# Patient Record
(demographics unavailable — no encounter records)

---

## 2018-09-26 NOTE — CAT SCAN REPORT
FINAL REPORT



EXAM:  CT ABDOMEN PELVIS W CON



HISTORY:  COLON CANCER 



TECHNIQUE:  CT examination of the ABDOMEN after IV contrast



CT examination of the PELVIS after IV contrast



PRIORS:  None.



FINDINGS:  

Degenerative change regional skeleton. No acute fracture. No CT

evidence of significant osseous lesion. Left L5 spondylolysis

without evidence of spondylolisthesis. 



No evidence of liver lesion. Nonspecific surgical clips adjacent

to the posterior right liver margin. Gallbladder not visualized.

Slight prominence of the common bile duct may reflect reservoir

effect after cholecystectomy. Diameter 10 mm. 



Normal-appearing adrenals, pancreas, and spleen. Intact normal

caliber abdominal aorta with moderate to severe calcified and

noncalcified atherosclerotic plaque. Plaque extends into the

aortic branches. Normal caliber IVC. 



Nonspecific, smoothly marginated, simple appearing, low density

bilateral renal lesions are statistically most likely cysts.

Bilateral renal hilar calcification most likely reflects

atherosclerosis. 



Staghorn like calculus left renal lower pole measures

approximately 10 x 16 mm. Nonspecific slight left hydronephrosis

and left ureteral distention diffusely. No CT evidence of left

ureteral calculus. No left renal excretion delay. 



Nonspecific trace free fluid adjacent to the posterior right

liver very small fat containing umbilical hernia. No inguinal

hernia. 



No retroperitoneal adenopathy. No mesenteric mass.

Normal-appearing stomach and duodenum. No small bowel distention

in the abdomen and pelvis. 



No pelvic free fluid. Normal-appearing rectum. No definite

prostate or seminal vesicle abnormality. 



Nonspecific diffuse mural thickening in the urinary bladder. 



Moderate sigmoid diverticulosis. Slight diffuse sigmoid colon

mural thickening is nonspecific. No adjacent inflammatory change.





No gross ascites, free air, or colonic distention.

Normal-appearing cecum, terminal ileum, and appendix. 



There are 2 short segments of luminal narrowing in the region the

transverse colon. Proximal involvement appears to be associated

with slight mural thickening and is 2.5 cm long. Distal

involvement without mural thickening is 5 mm long. These may be

artifact of peristalsis. Differential includes postoperative

scar, inflammation, infection, and/or apple-core neoplasm. 



IMPRESSION:  

Two short segments of luminal narrowing in the transverse colon.

These may be artifact of peristalsis. Proximal "lesion" appears

to contain mural thickening and is 2.5 cm long. Differential

includes postoperative scar, inflammation, infection, and/or

apple-core neoplastic



Moderate sigmoid diverticulosis. Slight diffuse mural thickening

in this region may be scarring from prior diverticulitis.

Differential includes slight acute diverticulitis without

adjacent inflammatory change



Left L5 spondylolysis without evidence of spondylolisthesis



10 x 16 mm staghorn like calculus in left renal lower pole

nonspecific slight diffuse left hydronephrosis without CT

evidence of ureteral calculus



Slight diffuse mural thickening in urinary bladder may be from

outlet obstruction. Differential includes cystitis

## 2018-09-26 NOTE — CAT SCAN REPORT
FINAL REPORT



EXAM:  CT CHEST W CON



HISTORY:  COLON CANCER 



TECHNIQUE:  CT examination of the chest after IV contrast



PRIORS:  AP CT 9/26/2018



FINDINGS:  

Please see same day AP CT report for abdominal findings. Normal

cardiac size without pericardial effusion. Intact normal caliber

thoracic aorta. Normal-appearing esophagus. No hilar mass or

mediastinal adenopathy. The visible pulmonary arteries are

diffusely patent. No acute fracture or significant osseous

lesion.



No pneumothorax or pleural effusion. No focal pulmonary

consolidation. Scattered slight pulmonary emphysema. No evidence

of lung nodule or mass.



IMPRESSION:  

No evidence of acute cardiopulmonary disease or mass/adenopathy



Slight pulmonary emphysema

## 2018-12-27 NOTE — DISCHARGE SUMMARY
Short Stay Discharge Plan


Activity: other (no straining )


Weight Bearing Status: Full Weight Bearing


Diet: low fat, low cholesterol, low salt


Special Instructions: other (inc fluids )


Follow up with: 


RAYNA MELTON MD [Primary Care Provider] - 7 Days


MAVERICK MEJIA MD [Staff Physician] - 7 Days

## 2018-12-27 NOTE — POST OPERATIVE NOTE
Date of procedure: 12/27/18


Pre-op diagnosis: L renal stone 


Post-op diagnosis: same


Findings: 





as above 


Procedure: 





L ESWL


Anesthesia: ELIZABETH


Surgeon: MAVERICK MJEIA


Estimated blood loss: none


Pathology: none


Condition: stable


Disposition: PACU

## 2018-12-27 NOTE — ANESTHESIA CONSULTATION
Anesthesia Consult and Med Hx


Date of service: 12/27/18





- Airway


Anesthetic Teeth Evaluation: Caps (multiple missing teeth; non loose)


ROM Head & Neck: Adequate


Mental/Hyoid Distance: Adequate


Mallampati Class: Class II


Intubation Access Assessment: Probably Good





- Pulmonary Exam


CTA: No (diffuse wheezing not cleared with cough; good air entry b/l)





- Cardiac Exam


Cardiac Exam: RRR





- Pre-Operative Health Status


ASA Pre-Surgery Classification: ASA3


Proposed Anesthetic Plan: General





- Pulmonary


Hx Smoking: Yes (>100pk yr hx)


SOB: Yes (chronic LU; breathing at baseline today)


COPD: Yes (daily anoro and prn albuterol)


Home Oxygen Therapy: No


Hx Sleep Apnea: No (YENNIFER PRE SCREEN HIGH RISK)





- Cardiovascular System


Hx Hypertension: Yes (took antihypertensives this morning)


Hx Coronary Artery Disease: Yes


Hx Heart Attack/AMI: No


Hx Percutaneous Transluminal Coronary Angioplasty (PTCA): No


Hx Cardia Arrhythmia: No





- Central Nervous System


Hx Neuromuscular Disorder: No (peripheral neuropathy)


Hx Seizures: No


CVA: No


Hx Psychiatric Problems: No





- Gastrointestinal


Hx Gastroesophageal Reflux Disease: Yes (asymptomatic today)





- Endocrine


Hx Renal Disease: No


Hx Liver Disease: Yes (remote hx colon cancer with liver met s/p lobectomy)


Hx Non-Insulin Dependent Diabetes: Yes


Hx Thyroid Disease: No





- Other Systems


Hx Alcohol Use: Yes (RARELY)


Hx Substance Use: No


Hx Cancer: Yes (hx metastatic colon ca s/p chemo. Port in left arm; not 

currently used)





- Additional Comments


Anesthesia Medical History Comments: No hx anesthetic complications. Used anoro 

this morning but no albuterol as he felt breathing was at baseline. Will 

administer albuterol neb preop.

## 2018-12-27 NOTE — ANESTHESIA DAY OF SURGERY
Anesthesia Day of Surgery





- Day of Surgery


Patient Examined: Yes


Patient H&P Reviewed: Yes


Patient is NPO: Yes


Beta Blockers: Yes


Pulmonary Clearance: Yes

## 2018-12-27 NOTE — OPERATIVE REPORT
PREOPERATIVE DIAGNOSIS:  Large left renal stone.



POSTOPERATIVE DIAGNOSIS:  Large left renal stone.



PROCEDURE:  In situ left lithotripsy.



SURGEON:  Osmany Reyes MD



ANESTHESIA:  General.



FINDINGS:  This gentleman has a 1.5 cm lower pole left renal stone.  He now

presents for lithotripsy.  There is another fragment in the small fallon.



DESCRIPTION OF PROCEDURE:  The patient was brought to the operating room and

placed on the operating table.  Following induction of anesthesia, stone was

well localized, both in the AP and oblique images.  Shocks were begun at 1 kV. 

Renal pause was carried out.  A total of 2500 shocks were given, maximum of 8

kV.  The patient tolerated the procedure well.  There was definitely less

density in the stone, but the stone was still quite prominent.  There was no

stone that was seen going down the ureter, so we decided not to place the stent.

 I suspect he may need percutaneous nephrolithotomy to get rid of the stone.  He

is 73 and he will have to decide if he wants more invasive procedures.  We

decided not to do cystoscopy.  I had explained this to his wife and brought to

recovery in stable condition.





DD: 12/27/2018 09:49

DT: 12/27/2018 12:08

JOB# [id number]  [id number]

DAVID/ANISA

## 2019-12-02 NOTE — EMERGENCY DEPARTMENT REPORT
HPI





- General


Time Seen by Provider: 12/02/19 06:50





- HPI


HPI: 


74-year-old C male presents to the emergency department via EMS from home as 

unresponsive.  EMS says that he has a elderly wife who was only able to provide 

information that he has a history of COPD and myelodysplastic syndrome.  He also

has a previous history of colon cancer with liver metastasis, but it is unknown 

whether or not this is in remission.  The patient did present altered and 

unresponsive.  However within 5 minutes of arrival the patient began talking and

became more responsive, although still does display some confusion.  Patient 

admits to having oxygen at home for his COPD but has not been using it.  He is a

tobacco smoker but denies any illicit drug use.  Patient initially had a room 

air pulse ox in the 50s that went up to close to 100% with some bag valve 

ventilation.





PCP: Dr Renetta Crespo: Dr Shi


Heme/Onc: Dr Devin CALVILLO Past Medical Hx





- Past Medical History


Hx Hypertension: Yes (took antihypertensives this morning)


Hx Heart Attack/AMI: No


Hx Diabetes: Yes (NO MEDS)


Hx GERD: Yes


Hx Liver Disease: Yes (remote hx colon cancer with liver met s/p lobectomy)


Hx Renal Disease: No


Hx Seizures: No


Hx Kidney Stones: Yes


Hx COPD: Yes (daily anoro and prn albuterol)


Hx HIV: No





- Social History


Smoking Status: Current Every Day Smoker





- Medications


Home Medications: 


                                Home Medications











 Medication  Instructions  Recorded  Confirmed  Last Taken  Type


 


Aspirin EC [Aspirin Enteric Coated 81 mg PO QDAY 05/05/16 12/02/19 10 Days Ago 

History





TAB]    ~12/17/18 


 


Metoprolol [Lopressor] 100 mg PO DAILY 05/05/16 12/18/18 12/27/18 04:30 History


 


Pravastatin Sodium [Pravastatin] 20 mg PO QHS 05/05/16 12/18/18 12/27/18 04:30 

History


 


amLODIPine [Norvasc] 5 mg PO DAILY 05/05/16 12/02/19 12/27/18 04:30 History


 


ALBUTEROL Inhaler(NF) [VENTOLIN 2 puff IH QDAY PRN 12/18/18 12/02/19 Unknown 

History





Inhaler(NF)]     


 


Fluticasone [Flonase] 1 spray NS QDAY 12/18/18 12/27/18 12/26/18 History


 


Glucosam-Chondro-Herb 149-Hyal 1,500 mg PO BID 12/18/18 12/02/19 10 Days Ago 

History





[Glucosamine-Chondr Complex Tab]    ~12/17/18 


 


Multivit-Min/FA/Lycopen/Lutein 1 each PO DAILY 12/18/18 12/18/18 10 Days Ago 

History





[Centrum Silver Tablet]    ~12/17/18 


 


Omega-3 Fatty Acids/Fish Oil [Fish 1 each PO DAILY 12/18/18 12/18/18 10 Days Ago

 History





Oil 1,000 mg Capsule]    ~12/17/18 


 


Umeclidinium Brm/Vilanterol Tr 1 each IH DAILY 12/18/18 12/18/18 12/27/18 04:30 

History





[Anoro Ellipta 62.5-25 Mcg INH]     


 


Metoprolol Succinate [Toprol Xl] 100 mg PO 12/02/19  Unknown History


 


Multivit-Min/FA/Lycopen/Lutein  12/02/19  Unknown History





[Centrum Silver Tablet]     














ED Review of Systems


ROS: 


Stated complaint: SEAN


Other details as noted in HPI





Comment: All other systems reviewed and negative


Constitutional: denies: chills, fever


Eyes: denies: eye pain, vision change


ENT: denies: ear pain, throat pain


Respiratory: shortness of breath, SOB with exertion, SOB at rest, wheezing


Cardiovascular: denies: chest pain, palpitations


Gastrointestinal: denies: abdominal pain, vomiting


Neurological: confusion.  denies: headache





Physical Exam





- Physical Exam


Physical Exam: 





GENERAL: The patient is ill-appearing.


HENT: Normocephalic.  Atraumatic.    Patient has moist mucous membranes.


EYES: Extraocular motions are intact.  Pupils equal reactive to light 

bilaterally.


NECK: Supple. Trachea is midline.


CHEST/LUNGS: Coarse breath sounds were the chest.  There is tachypnea with some 

accessory muscle use.  There is respiratory distress noted.


HEART/CARDIOVASCULAR: Regular.  There is mild tachycardia.  There is no murmur.


ABDOMEN: Abdomen is soft, nontender.  Patient has normal bowel sounds.  There is

no abdominal distention.


SKIN: Skin is warm and dry.


NEURO: Patient is awake and confused.  Nonverbal.  Withdraws from painful 

stimuli.  Not following commands.


MUSCULOSKELETAL: There is no tenderness or deformity.  There is no evidence of 

acute injury.





ED Course





- Consultations


Consultation #1: 





12/02/19 08:42


I spoke to the cardiologist on-call, Dr. Sosa, who is aware of the patient's 

ischemic changes on EKG and the elevated troponin, along with his renal 

insufficiency, anemia and thrombocytopenia.  The cardiology service will see the

patient as a consult.





12/02/19 14:32


It turns out the patient follows with Dr Izaguirre of [facility]. 

[facility] made aware and consult switched. 





ED Medical Decision Making





- Lab Data


Result diagrams: 


                                 12/02/19 06:58





                                 12/02/19 06:58





- EKG Data


-: EKG Interpreted by Me


EKG shows normal: sinus rhythm, axis, intervals, QRS complexes, ST-T waves (st 

depressions throughout most leads. No STEMI)


Rate: tachycardia (116 bpm)





- EKG Data


When compared to previous EKG there are: previous EKG unavailable


Interpretation: other (sinus tachycardia at 116 bpm, PVCs, ST depressions 

throughout most leads but no ST elevation)





- Radiology Data


Radiology results: report reviewed, image reviewed


interpreted by me: 





Chest x-ray shows some pulmonary vascular congestion, bilateral basilar pleural 

effusions and interstitial edema.





CT HEAD WITHOUT CONTRAST INDICATION / CLINICAL INFORMATION: Altered mental 

status.. Colon cancer. TECHNIQUE: Axial imaging performed from the skull apex 

through the skull base without the use of contrast. Sagittal and coronal ref

ormatted images. All CT scans at this location are performed using CT dose 

reduction for ALARA by means of automated exposure control. COMPARISON: None 

available. FINDINGS: CEREBRAL PARENCHYMA: Mild cortical atrophy and nonspecific 

chronic white matter changes are identified which are likely appropriate for 

this patient's age. The gray-white interface is well-defined. No abnormal 

density or acute territorial infarct is identified. No evidence for brain 

metastasis given no IV contrast was administered. HEMORRHAGE: None. EXTRA-AXIAL 

SPACES: Normal in size and morphology for the patient's age. VENTRICULAR SYSTEM:

Normal in size and morphology for the patient's age. MIDLINE SHIFT OR 

HERNIATION: None. CEREBELLUM / BRAINSTEM: No significant abnormality. CALVARIUM:

No significant abnormality. ORBITS: Normal as visualized. PARANASAL SINUSES / 

MASTOID AIR CELLS: Normal as visualized. SOFT TISSUES of HEAD: No significant 

abnormality. ADDITIONAL FINDINGS: None. IMPRESSION: No acute intracranial a

bnormality. Age-appropriate cortical volume loss and chronic white matter 

changes. 





- Medical Decision Making


This patient originally was sent in after having an unresponsive episode and did

present to the emergency department nonverbal and not following commands.  

However about 5 minutes into his ED course the patient became more awake, alert 

and oriented.  He does appear to have some respiratory distress with coarse 

breath sounds, accessory muscle use, tachypnea.  He was placed on a BiPAP which 

did improve his work of breathing.  Chest x-ray shows some pulmonary vascular 

congestion, interstitial edema and some basilar pleural effusions.  Patient's 

labs show a hemoglobin of 6.3 and thrombocytopenia consistent with his 

myelodysplastic syndrome.  2 units of packed red blood cells have been ordered 

for transfusion, however the patient has received some Lasix for some diuresis. 

Patient also appears to have some acute kidney injury, however the patient's 

wife says that he does have a history of some chronic kidney disease.  EKG did 

not show any signs of ST elevation MI but he does have some global ischemia with

ST depressions throughout most leads.  This may be secondary to his respiratory 

status and previous hypoxia and his symptomatic anemia.  The patient will be 

admitted to the hospital for further evaluation and treatment and was accepted 

for admission by the hospitalist service.








- Differential Diagnosis


MDS, Pneumonia, CHF, MI, Dysrythmia


Critical Care Time: Yes


Critical care time in (mins) excluding proc time.: 35


Critical care attestation.: 


If time is entered above; I have spent that time in minutes in the direct care 

of this critically ill patient, excluding procedure time.  Critical care time 

was spent on this patient and doing his initial evaluation, multiple re-

evaluations, ordering and interpretation of labs and imaging, discussion with 

the cardiology service, multiple discussions with the patient and his family.





Critical Care Time: 





35 minutes





ED Disposition


Clinical Impression: 


 Symptomatic anemia, Thrombocytopenia, Anemia requiring transfusions, Elevated 

troponin, Abnormal ECG, Renal insufficiency





Acute respiratory failure


Qualifiers:


 Respiratory failure complication: hypoxia Qualified Code(s): J96.01 - Acute 

respiratory failure with hypoxia





Dyspnea


Qualifiers:


 Dyspnea type: shortness of breath Qualified Code(s): R06.02 - Shortness of 

breath





CHF (congestive heart failure)


Qualifiers:


 Heart failure type: unspecified Heart failure chronicity: unspecified Qualified

Code(s): I50.9 - Heart failure, unspecified





Disposition: DC-09 OP ADMIT IP TO THIS HOSP


Is pt being admited?: Yes


Condition: Serious


Time of Disposition: 08:38

## 2019-12-02 NOTE — HISTORY AND PHYSICAL REPORT
History of Present Illness


Date of examination: 12/02/19


Date of admission: 


12/02/19 08:38





Chief complaint: 





sob


History of present illness: 





74-year-old male presents to the emergency department via EMS from home as 

unresponsive.  EMS says that he has a elderly wife who was only able to provide 

information that he has a history of COPD and myelodysplastic syndrome.  He also

has a previous history of colon cancer with liver metastasis, but it is unknown 

whether or not this is in remission.  The patient did present altered and 

unresponsive per ER physician.  However within 5 minutes of arrival the patient 

began talking and became more responsive, although still does displayed some 

confusion.  Upon mu eval he was awake and alert with conversational dyspnea but 

appropriate.  Family at bedside.  Patient admits to having oxygen at home for 

his COPD but has not been using it.  He is a tobacco smoker but denies any 

illicit drug use.  Patient initially had a room air pulse ox in the 50s that 

went up to close to 100% with some bag valve ventilation.  He denies CP








Past History


Past Medical History: COPD, diabetes, GERD, hypertension, other (liver dz)


Past Surgical History: No surgical history


Social history: smoking


Family history: no significant family history





Medications and Allergies


                                    Allergies











Allergy/AdvReac Type Severity Reaction Status Date / Time


 


morphine AdvReac  HALLUCINATI Verified 12/18/18 16:36





   ONS  


 


IVP DYE AdvReac  Hives Uncoded 12/18/18 16:36











                                Home Medications











 Medication  Instructions  Recorded  Confirmed  Last Taken  Type


 


Aspirin EC [Aspirin Enteric Coated 81 mg PO QDAY 05/05/16 12/02/19 10 Days Ago 

History





TAB]    ~12/17/18 


 


Metoprolol [Lopressor] 100 mg PO DAILY 05/05/16 12/18/18 12/27/18 04:30 History


 


Pravastatin Sodium [Pravastatin] 20 mg PO QHS 05/05/16 12/18/18 12/27/18 04:30 

History


 


amLODIPine [Norvasc] 5 mg PO DAILY 05/05/16 12/02/19 12/27/18 04:30 History


 


ALBUTEROL Inhaler(NF) [VENTOLIN 2 puff IH QDAY PRN 12/18/18 12/02/19 Unknown 

History





Inhaler(NF)]     


 


Fluticasone [Flonase] 1 spray NS QDAY 12/18/18 12/27/18 12/26/18 History


 


Glucosam-Chondro-Herb 149-Hyal 1,500 mg PO BID 12/18/18 12/02/19 10 Days Ago 

History





[Glucosamine-Chondr Complex Tab]    ~12/17/18 


 


Multivit-Min/FA/Lycopen/Lutein 1 each PO DAILY 12/18/18 12/18/18 10 Days Ago 

History





[Centrum Silver Tablet]    ~12/17/18 


 


Omega-3 Fatty Acids/Fish Oil [Fish 1 each PO DAILY 12/18/18 12/18/18 10 Days Ago

 History





Oil 1,000 mg Capsule]    ~12/17/18 


 


Umeclidinium Brm/Vilanterol Tr 1 each IH DAILY 12/18/18 12/18/18 12/27/18 04:30 

History





[Anoro Ellipta 62.5-25 Mcg INH]     


 


Metoprolol Succinate [Toprol Xl] 100 mg PO 12/02/19  Unknown History


 


Multivit-Min/FA/Lycopen/Lutein  12/02/19  Unknown History





[Centrum Silver Tablet]     














Review of Systems


All systems: negative





Exam





- Constitutional


Vitals: 


                                        











Temp Pulse Resp BP Pulse Ox


 


 98.1 F   107 H  18   128/62   99 


 


 12/02/19 13:27  12/02/19 14:47  12/02/19 14:47  12/02/19 14:47  12/02/19 14:47











General appearance: Present: mild distress, well-nourished





- EENT


Eyes: Present: PERRL


ENT: hearing intact, clear oral mucosa





- Neck


Neck: Present: supple, normal ROM





- Respiratory


Respiratory effort: normal


Respiratory: bilateral: CTA





- Cardiovascular


Heart Sounds: Present: S1 & S2.  Absent: rub, click





- Extremities


Extremities: pulses symmetrical, No edema


Peripheral Pulses: within normal limits





- Abdominal


General gastrointestinal: Present: soft, non-tender, non-distended, normal bowel

sounds


Male genitourinary: Present: normal





- Integumentary


Integumentary: Present: clear, warm, dry





- Musculoskeletal


Musculoskeletal: gait normal, strength equal bilaterally





- Psychiatric


Psychiatric: appropriate mood/affect, intact judgment & insight





- Neurologic


Neurologic: CNII-XII intact, moves all extremities





Results





- Labs


CBC & Chem 7: 


                                 12/02/19 06:58





                                 12/02/19 06:58


Labs: 


                             Laboratory Last Values











WBC  5.0 K/mm3 (4.5-11.0)   12/02/19  06:58    


 


RBC  1.99 M/mm3 (3.65-5.03)  L  12/02/19  06:58    


 


Hgb  6.3 gm/dl (11.8-15.2)  L  12/02/19  06:58    


 


Hct  21.1 % (35.5-45.6)  L  12/02/19  06:58    


 


MCV  106 fl (84-94)  H  12/02/19  06:58    


 


MCH  31 pg (28-32)   12/02/19  06:58    


 


MCHC  30 % (32-34)  L  12/02/19  06:58    


 


RDW  30.0 % (13.2-15.2)  H  12/02/19  06:58    


 


Plt Count  95 K/mm3 (140-440)  L  12/02/19  06:58    


 


Add Manual Diff  Complete   12/02/19  06:58    


 


Total Counted  100   12/02/19  06:58    


 


Seg Neuts % (Manual)  66.0 % (40.0-70.0)   12/02/19  06:58    


 


Band Neutrophils %  4.0 %  12/02/19  06:58    


 


Lymphocytes % (Manual)  25.0 % (13.4-35.0)   12/02/19  06:58    


 


Reactive Lymphs % (Man)  0 %  12/02/19  06:58    


 


Monocytes % (Manual)  3.0 % (0.0-7.3)   12/02/19  06:58    


 


Eosinophils % (Manual)  0 % (0.0-4.3)   12/02/19  06:58    


 


Basophils % (Manual)  2.0 % (0.0-1.8)  H  12/02/19  06:58    


 


Metamyelocytes %  0 %  12/02/19  06:58    


 


Myelocytes %  0 %  12/02/19  06:58    


 


Promyelocytes %  0 %  12/02/19  06:58    


 


Blast Cells %  0 %  12/02/19  06:58    


 


Nucleated RBC %  7.0 % (0.0-0.9)  H  12/02/19  06:58    


 


Seg Neutrophils # Man  0.0 K/mm3 (1.8-7.7)  L  12/02/19  06:58    


 


Band Neutrophils #  0.0 K/mm3  12/02/19  06:58    


 


Lymphocytes # (Manual)  0.0 K/mm3 (1.2-5.4)  L  12/02/19  06:58    


 


Abs React Lymphs (Man)  0.0 K/mm3  12/02/19  06:58    


 


Monocytes # (Manual)  0.0 K/mm3 (0.0-0.8)   12/02/19  06:58    


 


Eosinophils # (Manual)  0.0 K/mm3 (0.0-0.4)   12/02/19  06:58    


 


Basophils # (Manual)  0.0 K/mm3 (0.0-0.1)   12/02/19  06:58    


 


Metamyelocytes #  0.0 K/mm3  12/02/19  06:58    


 


Myelocytes #  0.0 K/mm3  12/02/19  06:58    


 


Promyelocytes #  0.0 K/mm3  12/02/19  06:58    


 


Blast Cells #  0.0 K/mm3  12/02/19  06:58    


 


WBC Morphology  Not Reportable   12/02/19  06:58    


 


Hypersegmented Neuts  Not Reportable   12/02/19  06:58    


 


Hyposegmented Neuts  Not Reportable   12/02/19  06:58    


 


Hypogranular Neuts  Not Reportable   12/02/19  06:58    


 


Smudge Cells  Not Reportable   12/02/19  06:58    


 


Toxic Granulation  Not Reportable   12/02/19  06:58    


 


Toxic Vacuolation  Not Reportable   12/02/19  06:58    


 


Dohle Bodies  Not Reportable   12/02/19  06:58    


 


Pelger-Huet Anomaly  Not Reportable   12/02/19  06:58    


 


Gasper Rods  Not Reportable   12/02/19  06:58    


 


Platelet Estimate  Consistent w auto   12/02/19  06:58    


 


Clumped Platelets  Not Reportable   12/02/19  06:58    


 


Plt Clumps, EDTA  Not Reportable   12/02/19  06:58    


 


Large Platelets  Few   12/02/19  06:58    


 


Giant Platelets  Not Reportable   12/02/19  06:58    


 


Platelet Satelliting  Not Reportable   12/02/19  06:58    


 


Plt Morphology Comment  Not Reportable   12/02/19  06:58    


 


RBC Morphology  Not Reportable   12/02/19  06:58    


 


Dimorphic RBCs  Not Reportable   12/02/19  06:58    


 


Polychromasia  Not Reportable   12/02/19  06:58    


 


Hypochromasia  Not Reportable   12/02/19  06:58    


 


Poikilocytosis  2+   12/02/19  06:58    


 


Anisocytosis  3+   12/02/19  06:58    


 


Microcytosis  Not Reportable   12/02/19  06:58    


 


Macrocytosis  Not Reportable   12/02/19  06:58    


 


Spherocytes  Not Reportable   12/02/19  06:58    


 


Pappenheimer Bodies  Not Reportable   12/02/19  06:58    


 


Sickle Cells  Not Reportable   12/02/19  06:58    


 


Target Cells  Not Reportable   12/02/19  06:58    


 


Tear Drop Cells  1+   12/02/19  06:58    


 


Ovalocytes  1+   12/02/19  06:58    


 


Helmet Cells  Not Reportable   12/02/19  06:58    


 


Chaparro-Salmon Bodies  Not Reportable   12/02/19  06:58    


 


Cabot Rings  Not Reportable   12/02/19  06:58    


 


Estee Cells  Not Reportable   12/02/19  06:58    


 


Bite Cells  Not Reportable   12/02/19  06:58    


 


Crenated Cell  Not Reportable   12/02/19  06:58    


 


Elliptocytes  1+   12/02/19  06:58    


 


Acanthocytes (Spur)  Not Reportable   12/02/19  06:58    


 


Rouleaux  Not Reportable   12/02/19  06:58    


 


Hemoglobin C Crystals  Not Reportable   12/02/19  06:58    


 


Schistocytes  Rare   12/02/19  06:58    


 


Malaria parasites  Not Reportable   12/02/19  06:58    


 


Sherif Bodies  Not Reportable   12/02/19  06:58    


 


Hem Pathologist Commnt  No   12/02/19  06:58    


 


D-Dimer  891.58 ng/mlDDU (0-234)  H  12/02/19  06:58    


 


POC ABG pH  7.072  (7.35-7.45)  L  12/02/19  06:58    


 


POC ABG pCO2  47.1  (35-45)  H  12/02/19  06:58    


 


POC ABG pO2  327  ()  H  12/02/19  06:58    


 


POC ABG HCO3  13.7  (22-26 mml/L)   12/02/19  06:58    


 


POC ABG Total CO2  15  (23-27mmol/L)   12/02/19  06:58    


 


POC ABG O2 Sat  100   12/02/19  06:58    


 


POC ABG Base Excess  -16  ((-2) - (+3)mmol/L)   12/02/19  06:58    


 


FiO2  100 %  12/02/19  06:58    


 


Sodium  141 mmol/L (137-145)   12/02/19  06:58    


 


Potassium  4.6 mmol/L (3.6-5.0)   12/02/19  06:58    


 


Chloride  99.6 mmol/L ()   12/02/19  06:58    


 


Carbon Dioxide  15 mmol/L (22-30)  L  12/02/19  06:58    


 


Anion Gap  31 mmol/L  12/02/19  06:58    


 


BUN  30 mg/dL (9-20)  H  12/02/19  06:58    


 


Creatinine  1.8 mg/dL (0.8-1.5)  H  12/02/19  06:58    


 


Estimated GFR  37 ml/min  12/02/19  06:58    


 


BUN/Creatinine Ratio  17 %  12/02/19  06:58    


 


Glucose  259 mg/dL ()  H  12/02/19  06:58    


 


Calcium  8.7 mg/dL (8.4-10.2)   12/02/19  06:58    


 


Total Bilirubin  0.90 mg/dL (0.1-1.2)   12/02/19  06:58    


 


AST  55 units/L (5-40)  H  12/02/19  06:58    


 


ALT  36 units/L (7-56)   12/02/19  06:58    


 


Alkaline Phosphatase  62 units/L ()   12/02/19  06:58    


 


Ammonia  30.0 umol/L (25-60)   12/02/19  06:58    


 


Troponin T  0.118 ng/mL (0.00-0.029)  H*  12/02/19  06:58    


 


NT-Pro-B Natriuret Pep  4210 pg/mL (0-900)  H  12/02/19  07:25    


 


Total Protein  7.0 g/dL (6.3-8.2)   12/02/19  06:58    


 


Albumin  4.2 g/dL (3.9-5)   12/02/19  06:58    


 


Albumin/Globulin Ratio  1.5 %  12/02/19  06:58    


 


Triglycerides  46 mg/dL (2-149)   12/02/19  06:58    


 


Cholesterol  114 mg/dL ()   12/02/19  06:58    


 


LDL Cholesterol Direct  68 mg/dL ()   12/02/19  06:58    


 


HDL Cholesterol  51 mg/dL (40-59)   12/02/19  06:58    


 


Cholesterol/HDL Ratio  2.23 %  12/02/19  06:58    


 


TSH  1.590 mlU/mL (0.270-4.200)   12/02/19  06:58    


 


Blood Type  O POSITIVE   12/02/19  08:40    


 


Antibody Screen  Negative   12/02/19  08:40    


 


Crossmatch  See Detail   12/02/19  08:40    














Assessment and Plan


Assessment and plan: 





Acute hypoxic resp failure.  Etiology likely sec heart failure.  CXR with 

bilateral interstitial opacities and elevated BNP.  Check Echo





Elevated trop.  Cards consulted.  Check ECHO





MDS.  Pt with recent dx of MDS and is followed by Devin Wolfe.  Heme consult





ARf.  Nephrology consult





Anemia.  Tansfusee 2 units PRBCs

## 2019-12-02 NOTE — CAT SCAN REPORT
CT HEAD WITHOUT CONTRAST



INDICATION / CLINICAL INFORMATION:  Altered mental status.. Colon cancer.



TECHNIQUE: Axial imaging performed from the skull apex through the skull base without the use of cont
rast.  Sagittal and coronal reformatted images.  All CT scans at this location are performed using CT
 dose reduction for ALARA by means of automated exposure control. 



COMPARISON: None available.



FINDINGS:



CEREBRAL PARENCHYMA: Mild cortical atrophy and nonspecific chronic white matter changes are identifie
d which are likely appropriate for this patient's age. The gray-white interface is well-defined. No a
bnormal density or acute territorial infarct is identified. No evidence for brain metastasis given no
 IV contrast was administered.

HEMORRHAGE: None.

EXTRA-AXIAL SPACES: Normal in size and morphology for the patient's age.

VENTRICULAR SYSTEM: Normal in size and morphology for the patient's age.

MIDLINE SHIFT OR HERNIATION: None.



CEREBELLUM / BRAINSTEM: No significant abnormality.



CALVARIUM: No significant abnormality.

ORBITS: Normal as visualized.

PARANASAL SINUSES / MASTOID AIR CELLS: Normal as visualized.

SOFT TISSUES of HEAD: No significant abnormality.



ADDITIONAL FINDINGS: None.



IMPRESSION:

No acute intracranial abnormality. Age-appropriate cortical volume loss and chronic white matter hernandez
ges.



Signer Name: Jah Lang Jr, MD 

Signed: 12/2/2019 8:42 AM

 Workstation Name: [id number]

## 2019-12-02 NOTE — XRAY REPORT
Chest single view



INDICATION: Chest pain



IMPRESSION: Streaky interstitial opacities are present throughout both lungs. The heart size is marizol
l at this time. Small bilateral pleural effusions are present.



Signer Name: Adolfo Decker MD 

Signed: 12/2/2019 7:14 AM

 Workstation Name: [id number]

## 2019-12-02 NOTE — CONSULTATION
History of Present Illness


Consult date: 12/02/19


Requesting physician: TIFFANI SILVA


Consult reason: elevated troponin, other (abn ekg)


History of present illness: 


The pt is a 74-year-old male with a past medical history of HTN, DM, COPD 

(followed by Dr. Shi), ongoing tobacco use, colon cancer with mets to liver 

in 2002, s/p liver resection, myelodysplastic syndrome diagnosed 1 month ago, 

followed by Dr. Beltran. He is followed in our office by Dr. Izaguirre. He presented to 

the emergency department via EMS from home with c/o respiratory distress. Pt has

been experiencing progressively worsening SOB and wheezing for the past day. He 

apparently had an episode of unresponsiveness and thus EMS was called. Per ED 

record, the patient did present altered and unresponsive.  However within 5 

minutes of arrival the patient began talking and became more responsive. On eval

uation, pt appears alert and oriented. Patient admits to having oxygen at home 

for his COPD but has not been using it.  He is a tobacco smoker but denies any 

illicit drug use.  Patient initially had a room air pulse ox in the 50s that 

went up to close to 100% with some bag valve ventilation. Pt is currently on O2 

via venti mask. Pt denies any chest pain, palpitations, n/v, diaphoresis. He is 

noted to have elevated troponon and ECG with diffuse ST depressions and thus 

cardiology has been consulted. Labwork is significant for H/H 6.3/21.1, plt 95, 

elevated DDimer, BUN/Cr 30/1.8. CXR with some pulmonary edema. Head CT with NAF.







Echo done 5/2011 showed EF 50-55%, mild LVH, mild MR, mild AR, trivial TR. 








Past History


Past Medical History: other (as per HPI)





Medications and Allergies


                                    Allergies











Allergy/AdvReac Type Severity Reaction Status Date / Time


 


morphine AdvReac  HALLUCINATI Verified 12/18/18 16:36





   ONS  


 


IVP DYE AdvReac  Hives Uncoded 12/18/18 16:36











                                Home Medications











 Medication  Instructions  Recorded  Confirmed  Last Taken  Type


 


Aspirin EC [Aspirin Enteric Coated 81 mg PO QDAY 05/05/16 12/02/19 10 Days Ago 

History





TAB]    ~12/17/18 


 


Metoprolol [Lopressor] 100 mg PO DAILY 05/05/16 12/18/18 12/27/18 04:30 History


 


Pravastatin Sodium [Pravastatin] 20 mg PO QHS 05/05/16 12/18/18 12/27/18 04:30 

History


 


amLODIPine [Norvasc] 5 mg PO DAILY 05/05/16 12/02/19 12/27/18 04:30 History


 


ALBUTEROL Inhaler(NF) [VENTOLIN 2 puff IH QDAY PRN 12/18/18 12/02/19 Unknown 

History





Inhaler(NF)]     


 


Fluticasone [Flonase] 1 spray NS QDAY 12/18/18 12/27/18 12/26/18 History


 


Glucosam-Chondro-Herb 149-Hyal 1,500 mg PO BID 12/18/18 12/02/19 10 Days Ago 

History





[Glucosamine-Chondr Complex Tab]    ~12/17/18 


 


Multivit-Min/FA/Lycopen/Lutein 1 each PO DAILY 12/18/18 12/18/18 10 Days Ago 

History





[Centrum Silver Tablet]    ~12/17/18 


 


Omega-3 Fatty Acids/Fish Oil [Fish 1 each PO DAILY 12/18/18 12/18/18 10 Days Ago

 History





Oil 1,000 mg Capsule]    ~12/17/18 


 


Umeclidinium Brm/Vilanterol Tr 1 each IH DAILY 12/18/18 12/18/18 12/27/18 04:30 

History





[Anoro Ellipta 62.5-25 Mcg INH]     


 


Metoprolol Succinate [Toprol Xl] 100 mg PO 12/02/19  Unknown History


 


Multivit-Min/FA/Lycopen/Lutein  12/02/19  Unknown History





[Centrum Silver Tablet]     














Review of Systems


Constitutional: no fever, no chills, no sweats


Ears, nose, mouth and throat: no ear pain, no nose pain, no sinus pressure, no 

sinus pain


Cardiovascular: shortness of breath, dyspnea on exertion, no chest pain, no 

orthopnea, no palpitations, no rapid/irregular heart beat, no lightheadedness, 

no high blood pressure


Respiratory: cough, shortness of breath, dyspnea on exertion, wheezing, no 

congestion, no pain on inspiration


Gastrointestinal: no abdominal pain, no nausea, no vomiting, no diarrhea, no 

constipation, no change in bowel habits


Genitourinary Male: no dysuria, no hematuria, no flank pain, no discharge, no 

urinary frequency, no urinary hesitancy


Musculoskeletal: no neck stiffness, no neck pain


Integumentary: no rash, no pruritis, no redness, no sores, no wounds


Neurological: no head injury, no paralysis, no weakness, no parathesias, no 

numbness, no tingling, no seizures


Psychiatric: no anxiety


Endocrine: no cold intolerance, no heat intolerance


Hematologic/Lymphatic: no easy bruising, no easy bleeding


Allergic/Immunologic: no urticaria, no wheezing





Physical Examination


                                   Vital Signs











Pulse Resp Pulse Ox


 


 116 H  26 H  100 


 


 12/02/19 06:50  12/02/19 06:50  12/02/19 06:50











General appearance: mild distress (sob)


HEENT: Positive: PERRL, Normocephaly, Mucus Membranes Moist


Neck: Positive: neck supple, trachea midline


Cardiac: Positive: Reg Rate and Rhythm, S1/S2


Lungs: Positive: Decreased Breath Sounds, Wheezes, Rhonchi, Oxygen


Neuro: Positive: Grossly Intact


Abdomen: Negative: Tender


Skin: Negative: Rash


Musculoskeletal: No Pain


Extremities: Present: edema (trace BLE)





Results





                                 12/02/19 06:58





                                 12/02/19 06:58


                                 Cardiac Enzymes











  12/02/19 Range/Units





  06:58 


 


AST  55 H  (5-40)  units/L








                                     Lipids











  12/02/19 Range/Units





  06:58 


 


Triglycerides  46  (2-149)  mg/dL


 


Cholesterol  114  ()  mg/dL


 


HDL Cholesterol  51  (40-59)  mg/dL


 


Cholesterol/HDL Ratio  2.23  %








                                       CBC











  12/02/19 Range/Units





  06:58 


 


WBC  5.0  (4.5-11.0)  K/mm3


 


RBC  1.99 L  (3.65-5.03)  M/mm3


 


Hgb  6.3 L  (11.8-15.2)  gm/dl


 


Hct  21.1 L  (35.5-45.6)  %


 


Plt Count  95 L  (140-440)  K/mm3








                          Comprehensive Metabolic Panel











  12/02/19 Range/Units





  06:58 


 


Sodium  141  (137-145)  mmol/L


 


Potassium  4.6  (3.6-5.0)  mmol/L


 


Chloride  99.6  ()  mmol/L


 


Carbon Dioxide  15 L  (22-30)  mmol/L


 


BUN  30 H  (9-20)  mg/dL


 


Creatinine  1.8 H  (0.8-1.5)  mg/dL


 


Glucose  259 H  ()  mg/dL


 


Calcium  8.7  (8.4-10.2)  mg/dL


 


AST  55 H  (5-40)  units/L


 


ALT  36  (7-56)  units/L


 


Alkaline Phosphatase  62  ()  units/L


 


Total Protein  7.0  (6.3-8.2)  g/dL


 


Albumin  4.2  (3.9-5)  g/dL














- Imaging and Cardiology


Echo: pending, report reviewed (5/2011 showed EF 50-55%, mild LVH, mild MR, mild

AR, trivial TR. )


EKG: report reviewed, image reviewed





EKG interpretations





- Telemetry


EKG Rhythm: Sinus Rhythm





- EKG


Sinus rhythms and dysrhythmias: sinus rhythm


Repolarization changes or abnormalities: ST or T wave suggestive of ischemia





Assessment and Plan


Pt is noted to have NSTEMI and abnormal ECG in setting of MDS, severe anemia, 

thrombocytopenia, acute HF and PRAKASH. Pt denies any occurrence of chest pain. No 

heparin or DAPT at this time due to anemia and thrombocytopenia. Will cont to 

trend Jose Cruz, f/u ECG in AM and obtain echo. Can consider ischemic evaluation once 

medically stabilized and H/H improved. Initiate low dose lopressor (beta 

selective) and statin. 


Pt is receiving PRBC tx per primary. Cont with cautious diuresis in setting of 

PRAKASH. F/u CBC and CMP in AM.


Heme/onc and nephrology have been consulted. Recommend pulmonary consultation 

per primary team, as well.


Further recs to follow per hospital course. 





The patient has been seen in conjunction with Dr. ALONDRA Owens who agrees with the 

assessment and plan of care.  








- Patient Problems


(1) Acute and chronic respiratory failure


Current Visit: Yes   Status: Acute   





(2) COPD (chronic obstructive pulmonary disease)


Current Visit: Yes   Status: Chronic   





(3) Symptomatic anemia


Current Visit: Yes   Status: Acute   





(4) Altered mental status


Current Visit: Yes   Status: Resolved   





(5) Thrombocytopenia


Current Visit: Yes   Status: Acute   





(6) Acute heart failure


Current Visit: Yes   Status: Acute   





(7) NSTEMI (non-ST elevated myocardial infarction)


Current Visit: Yes   Status: Acute   





(8) Abnormal ECG


Current Visit: Yes   Status: Acute   





(9) PRAKASH (acute kidney injury)


Current Visit: Yes   Status: Acute   





(10) Diabetes


Current Visit: Yes   Status: Chronic   





(11) Myelodysplastic syndrome


Current Visit: Yes   Status: Chronic   





(12) History of colon cancer


Current Visit: Yes   Status: Chronic   





(13) Tobacco use


Current Visit: Yes   Status: Chronic

## 2019-12-03 NOTE — PROGRESS NOTE
Assessment and Plan


Pt's clinical presentation appears c/w NSTEMI type II in setting of MDS, severe 

anemia, thrombocytopenia and PRAKASH. H/H improving s/p PRBC tx. ECG this AM shows 

NSR, normalization of ST depressions. Pt denies any occurrence of chest pain. 

Echo reviewed - EF 50-55%, pseudonormalization, mild AR, mild AS, mod MR, mod 

pulm HTN. Cont present conservative cardiac management at this time. Can 

consider ischemic evaluation once medically stabilized and H/H improved - could 

be done as OP.


Nephrology and heme/onc consultations pending. Recommend pulmonary consultation 

per primary, as well. 





The patient has been seen in conjunction with Dr. ALONDRA Owens who agrees with the 

assessment and plan of care.  








- Patient Problems


(1) Acute and chronic respiratory failure


Current Visit: Yes   Status: Acute   





(2) COPD (chronic obstructive pulmonary disease)


Current Visit: Yes   Status: Chronic   





(3) Symptomatic anemia


Current Visit: Yes   Status: Acute   





(4) Altered mental status


Current Visit: Yes   Status: Resolved   





(5) Thrombocytopenia


Current Visit: Yes   Status: Acute   





(6) NSTEMI (non-ST elevated myocardial infarction)


Current Visit: Yes   Status: Acute   


Plan to address problem: 


type II








(7) Abnormal ECG


Current Visit: Yes   Status: Acute   





(8) PRAKASH (acute kidney injury)


Current Visit: Yes   Status: Acute   





(9) Diabetes


Current Visit: Yes   Status: Chronic   





(10) Myelodysplastic syndrome


Current Visit: Yes   Status: Chronic   





(11) History of colon cancer


Current Visit: Yes   Status: Chronic   





(12) Tobacco use


Current Visit: Yes   Status: Chronic   





Subjective


Date of service: 12/03/19


Principal diagnosis: COPD exac; resp failure; anemia; MDS; NSTEMI


Interval history: 


pt resting in bed, states he is feeling better today. s/p PRBC tx yesterday. in 

ST HR 100s on tele, no current cardiac complaints.








Objective





                                Last Vital Signs











Temp  97.3 F L  12/03/19 12:59


 


Pulse  91 H  12/03/19 12:00


 


Resp  24   12/03/19 12:00


 


BP  115/46   12/03/19 12:00


 


Pulse Ox  98   12/03/19 12:00

















- Physical Examination


General: No Apparent Distress


HEENT: Positive: PERRL, Normocephaly, Mucus Membranes Moist


Neck: Positive: neck supple, trachea midline


Cardiac: Positive: Regular Rhythm, S1/S2


Lungs: Positive: Decreased Breath Sounds


Neuro: Positive: Grossly Intact


Abdomen: Negative: Tender


Skin: Negative: Rash


Musculoskeletal: No Pain


Extremities: Present: edema (trace BLE)





- Labs and Meds


                                       CBC











  12/03/19 Range/Units





  07:01 


 


WBC  2.3 L  (4.5-11.0)  K/mm3


 


RBC  2.43 L  (3.65-5.03)  M/mm3


 


Hgb  7.6 L  (11.8-15.2)  gm/dl


 


Hct  22.9 L  (35.5-45.6)  %


 


Plt Count  41 L  (140-440)  K/mm3








                          Comprehensive Metabolic Panel











  12/03/19 Range/Units





  04:23 


 


Sodium  143  (137-145)  mmol/L


 


Potassium  4.6  (3.6-5.0)  mmol/L


 


Chloride  106.7  ()  mmol/L


 


Carbon Dioxide  25  D  (22-30)  mmol/L


 


BUN  44 H  (9-20)  mg/dL


 


Creatinine  1.8 H  (0.8-1.5)  mg/dL


 


Glucose  154 H  ()  mg/dL


 


Calcium  8.3 L  (8.4-10.2)  mg/dL














- Imaging and Cardiology


EKG: report reviewed, image reviewed


Echo: pending, report reviewed (5/2011 showed EF 50-55%, mild LVH, mild MR, mild

AR, trivial TR. )





- EKG


Sinus rhythms and dysrhythmias: sinus rhythm


Repolarization changes or abnormalities: ST or T wave suggestive of ischemia

## 2019-12-03 NOTE — CONSULTATION
History of Present Illness





- Reason for Consult


Consult date: 12/03/19





- History of Present Illness





Mr. Short is a 75yo with type II DM, hypertension, COPD, colon CA w/ mets to

liver s/p resection and recent dx of MDS who presented to the ED with via EMS 

with difficulty breathing.  Per records, patient reported a one day history of 

progressive worsening of SOB and wheezing.  He had a period of unresponsiveness 

at home prompting call to EMS.  In the ED, patient was initially unresponsive.  

However, w/in five minutes of arrival, he became more responsive and began 

talking.





Patient reports that he was in usual state of health until 1 day prior to 

admission when he began experiencing intermittent episodes of left arm/shoulder 

associated with orthopnea and diaphoresis.  He reports episodes began at appx 

3am on 12/1 and lasted appx 15minutes; sx subsided after sitting upright in bed 

but returned.





Labs in the ED were notable for BUN 30, SCr 1.8mg/dL and Hb 6.3.  Additional 

work up notable for an elevated troponin and EKG with diffuse ST depression.





Past History


Past Medical History: COPD, diabetes, hypertension, other (hx of colon ca w/ 

liver mets s/p resection, MDS)


Past Surgical History: No surgical history


Social history: smoking


Family history: no significant family history





Medications and Allergies


                                    Allergies











Allergy/AdvReac Type Severity Reaction Status Date / Time


 


morphine AdvReac  HALLUCINATI Verified 12/18/18 16:36





   ONS  


 


IVP DYE AdvReac  Hives Uncoded 12/18/18 16:36











                                Home Medications











 Medication  Instructions  Recorded  Confirmed  Last Taken  Type


 


Aspirin EC [Aspirin Enteric Coated 81 mg PO QDAY 05/05/16 12/02/19 10 Days Ago 

History





TAB]    ~12/17/18 


 


Metoprolol [Lopressor] 100 mg PO DAILY 05/05/16 12/18/18 12/27/18 04:30 History


 


Pravastatin Sodium [Pravastatin] 20 mg PO QHS 05/05/16 12/18/18 12/27/18 04:30 

History


 


amLODIPine [Norvasc] 5 mg PO DAILY 05/05/16 12/02/19 12/27/18 04:30 History


 


ALBUTEROL Inhaler(NF) [VENTOLIN 2 puff IH QDAY PRN 12/18/18 12/02/19 Unknown 

History





Inhaler(NF)]     


 


Fluticasone [Flonase] 1 spray NS QDAY 12/18/18 12/27/18 12/26/18 History


 


Glucosam-Chondro-Herb 149-Hyal 1,500 mg PO BID 12/18/18 12/02/19 10 Days Ago 

History





[Glucosamine-Chondr Complex Tab]    ~12/17/18 


 


Multivit-Min/FA/Lycopen/Lutein 1 each PO DAILY 12/18/18 12/18/18 10 Days Ago 

History





[Centrum Silver Tablet]    ~12/17/18 


 


Omega-3 Fatty Acids/Fish Oil [Fish 1 each PO DAILY 12/18/18 12/18/18 10 Days Ago

 History





Oil 1,000 mg Capsule]    ~12/17/18 


 


Umeclidinium Brm/Vilanterol Tr 1 each IH DAILY 12/18/18 12/18/18 12/27/18 04:30 

History





[Anoro Ellipta 62.5-25 Mcg INH]     


 


Metoprolol Succinate [Toprol Xl] 100 mg PO 12/02/19  Unknown History


 


Multivit-Min/FA/Lycopen/Lutein  12/02/19  Unknown History





[Centrum Silver Tablet]     











Active Meds: 


Active Medications





Acetaminophen (Tylenol)  650 mg PO Q4H PRN


   PRN Reason: Pain MILD(1-3)/Fever >100.5/HA


Atorvastatin Calcium (Atorvastatin)  10 mg PO QHS [facility]


   Last Admin: 12/02/19 22:33 Dose:  10 mg


   Documented by: 


Heparin Sodium (Porcine) (Heparin)  5,000 unit SUB-Q Q8HR [facility]


   Last Admin: 12/03/19 08:06 Dose:  Not Given


   Documented by: 


Metoprolol Tartrate (Metoprolol)  12.5 mg PO BID [facility]


   Last Admin: 12/03/19 11:25 Dose:  12.5 mg


   Documented by: 


Ondansetron HCl (Zofran)  4 mg IV Q8H PRN


   PRN Reason: Nausea And Vomiting


Sodium Chloride (Sodium Chloride Flush Syringe 10 Ml)  10 ml IV BID [facility]


   Last Admin: 12/03/19 11:26 Dose:  10 ml


   Documented by: 


Sodium Chloride (Sodium Chloride Flush Syringe 10 Ml)  10 ml IV PRN PRN


   PRN Reason: LINE FLUSH


   Last Admin: 12/02/19 17:48 Dose:  10 ml


   Documented by: 











Review of Systems


All systems: negative





Exam





- Vital Signs


Vital signs: 


                                   Vital Signs











Pulse Resp Pulse Ox


 


 116 H  26 H  100 


 


 12/02/19 06:50  12/02/19 06:50  12/02/19 06:50














- General Appearance


General appearance: well-developed, well-nourished


EENT: ATNC


Respiratory: Other (+rhonchi)


Heart: tachycardia, S1S2


Gastrointestinal: Present: normal.  Absent: tenderness, distended


Integumentary: no rash, warm and dry


Neurologic: no focal deficit


Musculoskeletal: Present: other (no edema)


Psychiatric: cooperative





Results





- Lab Results





                                 12/03/19 07:01





                                 12/03/19 04:23


                             Most recent lab results











Calcium  8.3 mg/dL (8.4-10.2)  L  12/03/19  04:23    














Assessment and Plan





Impression:


* Nonoliguric PRAKASH vs underlying chronic kidney disease


   --SCr 1.0mg/dL in Sept 2018


* NSTEMI


* Acute hypoxic/hypercapnic respiratory failure


* COPD


* Myelodysplastic syndrome


* Type II DM


* Hypertension





Plan:


* Renal function has remained stable since admission; baseline renal function 

  unknown; will attempt to obtain outpatient labs.


* Hold diuretics today  - last dose on 12/2


* Will obtain urine studies and renal ultrasound


* Cardiology recommendations noted - TTE pending


* Patient is followed by Dr. Cedillo as an outpatient - rec pulmonary 

  consultation


* Avoid potential nephrotoxins


* Dose medications for renal function


* AM labs ordered

## 2019-12-03 NOTE — PROGRESS NOTE
Assessment and Plan


Assessment and plan: 





Acute hypoxic resp failure.  Etiology likely sec heart failure.  CXR with 

bilateral interstitial opacities and elevated BNP.  Check Echo





Elevated trop.  Cards consulted.  Check ECHO





MDS.  Pt with recent dx of MDS and is followed by Devin Wolfe.  Heme consult





ARf.  Nephrology consult





Anemia.  Tansfusee 2 units PRBCs





History


Interval history: 





No new issues overnight





Hospitalist Physical





- Constitutional


Vitals: 


                                        











Temp Pulse Resp BP Pulse Ox


 


 97.6 F   101 H  14   105/64   99 


 


 12/03/19 16:00  12/03/19 14:00  12/03/19 14:00  12/03/19 14:00  12/03/19 14:18











General appearance: Present: mild distress (sob)





- EENT


Eyes: Present: PERRL, EOM intact


ENT: hearing intact, clear oral mucosa, dentition normal





- Neck


Neck: Present: supple, normal ROM





- Respiratory


Respiratory effort: normal


Respiratory: bilateral: CTA





- Cardiovascular


Rhythm: regular


Heart Sounds: Present: S1 & S2.  Absent: gallop, rub





- Extremities


Extremities: no ischemia, No edema, Full ROM





- Abdominal


General gastrointestinal: soft, non-tender, non-distended, normal bowel sounds





- Integumentary


Integumentary: Present: clear, warm, dry





- Neurologic


Neurologic: CNII-XII intact, moves all extremities





Results





- Labs


CBC & Chem 7: 


                                 12/03/19 07:01





                                 12/03/19 04:23


Labs: 


                             Laboratory Last Values











WBC  2.3 K/mm3 (4.5-11.0)  L  12/03/19  07:01    


 


RBC  2.43 M/mm3 (3.65-5.03)  L  12/03/19  07:01    


 


Hgb  7.6 gm/dl (11.8-15.2)  L  12/03/19  07:01    


 


Hct  22.9 % (35.5-45.6)  L  12/03/19  07:01    


 


MCV  95 fl (84-94)  H  12/03/19  07:01    


 


MCH  32 pg (28-32)   12/03/19  07:01    


 


MCHC  33 % (32-34)   12/03/19  07:01    


 


RDW  23.8 % (13.2-15.2)  H  12/03/19  07:01    


 


Plt Count  41 K/mm3 (140-440)  L  12/03/19  07:01    


 


Add Manual Diff  Complete   12/03/19  07:01    


 


Total Counted  100   12/03/19  07:01    


 


Seg Neuts % (Manual)  71.0 % (40.0-70.0)  H  12/03/19  07:01    


 


Band Neutrophils %  5.0 %  12/03/19  07:01    


 


Lymphocytes % (Manual)  17.0 % (13.4-35.0)   12/03/19  07:01    


 


Reactive Lymphs % (Man)  0 %  12/03/19  07:01    


 


Monocytes % (Manual)  7.0 % (0.0-7.3)   12/03/19  07:01    


 


Eosinophils % (Manual)  0 % (0.0-4.3)   12/03/19  07:01    


 


Basophils % (Manual)  0 % (0.0-1.8)   12/03/19  07:01    


 


Metamyelocytes %  0 %  12/03/19  07:01    


 


Myelocytes %  0 %  12/03/19  07:01    


 


Promyelocytes %  0 %  12/03/19  07:01    


 


Blast Cells %  0 %  12/03/19  07:01    


 


Nucleated RBC %  1.0 % (0.0-0.9)  H  12/03/19  07:01    


 


Seg Neutrophils # Man  1.6 K/mm3 (1.8-7.7)  L  12/03/19  07:01    


 


Band Neutrophils #  0.1 K/mm3  12/03/19  07:01    


 


Lymphocytes # (Manual)  0.4 K/mm3 (1.2-5.4)  L  12/03/19  07:01    


 


Abs React Lymphs (Man)  0.0 K/mm3  12/03/19  07:01    


 


Monocytes # (Manual)  0.2 K/mm3 (0.0-0.8)   12/03/19  07:01    


 


Eosinophils # (Manual)  0.0 K/mm3 (0.0-0.4)   12/03/19  07:01    


 


Basophils # (Manual)  0.0 K/mm3 (0.0-0.1)   12/03/19  07:01    


 


Metamyelocytes #  0.0 K/mm3  12/03/19  07:01    


 


Myelocytes #  0.0 K/mm3  12/03/19  07:01    


 


Promyelocytes #  0.0 K/mm3  12/03/19  07:01    


 


Blast Cells #  0.0 K/mm3  12/03/19  07:01    


 


WBC Morphology  Not Reportable   12/03/19  07:01    


 


Hypersegmented Neuts  Not Reportable   12/03/19  07:01    


 


Hyposegmented Neuts  Not Reportable   12/03/19  07:01    


 


Hypogranular Neuts  Not Reportable   12/03/19  07:01    


 


Smudge Cells  Not Reportable   12/03/19  07:01    


 


Toxic Granulation  Not Reportable   12/03/19  07:01    


 


Toxic Vacuolation  Not Reportable   12/03/19  07:01    


 


Dohle Bodies  Not Reportable   12/03/19  07:01    


 


Pelger-Huet Anomaly  Not Reportable   12/03/19  07:01    


 


[place]asper Rods  Not Reportable   12/03/19  07:01    


 


Platelet Estimate  Consistent w auto   12/03/19  07:01    


 


Clumped Platelets  Not Reportable   12/03/19  07:01    


 


Plt Clumps, EDTA  Not Reportable   12/03/19  07:01    


 


Large Platelets  Not Reportable   12/03/19  07:01    


 


Giant Platelets  Not Reportable   12/03/19  07:01    


 


Platelet Satelliting  Not Reportable   12/03/19  07:01    


 


Plt Morphology Comment  Not Reportable   12/03/19  07:01    


 


RBC Morphology  Not Reportable   12/03/19  07:01    


 


Dimorphic RBCs  Not Reportable   12/03/19  07:01    


 


Polychromasia  Few   12/03/19  07:01    


 


Hypochromasia  1+   12/03/19  07:01    


 


Poikilocytosis  Not Reportable   12/03/19  07:01    


 


Anisocytosis  2+   12/03/19  07:01    


 


Microcytosis  Not Reportable   12/03/19  07:01    


 


Macrocytosis  1+   12/03/19  07:01    


 


Spherocytes  Not Reportable   12/03/19  07:01    


 


Pappenheimer Bodies  Not Reportable   12/03/19  07:01    


 


Sickle Cells  Not Reportable   12/03/19  07:01    


 


Target Cells  Not Reportable   12/03/19  07:01    


 


Tear Drop Cells  Few   12/03/19  07:01    


 


Ovalocytes  1+   12/03/19  07:01    


 


Helmet Cells  Not Reportable   12/03/19  07:01    


 


[place] Bodies  Not Reportable   12/03/19  07:01    


 


Cabot Rings  Not Reportable   12/03/19  07:01    


 


[place] Cells  Not Reportable   12/03/19  07:01    


 


Bite Cells  Not Reportable   12/03/19  07:01    


 


Crenated Cell  Not Reportable   12/03/19  07:01    


 


Elliptocytes  Not Reportable   12/03/19  07:01    


 


Acanthocytes (Spur)  Not Reportable   12/03/19  07:01    


 


Rouleaux  Not Reportable   12/03/19  07:01    


 


Hemoglobin C Crystals  Not Reportable   12/03/19  07:01    


 


Schistocytes  Rare   12/03/19  07:01    


 


Malaria parasites  Not Reportable   12/03/19  07:01    


 


[place] Bodies  Not Reportable   12/03/19  07:01    


 


Hem Pathologist Commnt  No   12/03/19  07:01    


 


D-Dimer  891.58 ng/mlDDU (0-234)  H  12/02/19  06:58    


 


POC ABG pH  7.072  (7.35-7.45)  L  12/02/19  06:58    


 


POC ABG pCO2  47.1  (35-45)  H  12/02/19  06:58    


 


POC ABG pO2  327  ()  H  12/02/19  06:58    


 


POC ABG HCO3  13.7  (22-26 mml/L)   12/02/19  06:58    


 


POC ABG Total CO2  15  (23-27mmol/L)   12/02/19  06:58    


 


POC ABG O2 Sat  100   12/02/19  06:58    


 


POC ABG Base Excess  -16  ((-2) - (+3)mmol/L)   12/02/19  06:58    


 


FiO2  100 %  12/02/19  06:58    


 


Sodium  143 mmol/L (137-145)   12/03/19  04:23    


 


Potassium  4.6 mmol/L (3.6-5.0)   12/03/19  04:23    


 


Chloride  106.7 mmol/L ()   12/03/19  04:23    


 


Carbon Dioxide  25 mmol/L (22-30)  D 12/03/19  04:23    


 


Anion Gap  16 mmol/L  12/03/19  04:23    


 


BUN  44 mg/dL (9-20)  H  12/03/19  04:23    


 


Creatinine  1.8 mg/dL (0.8-1.5)  H  12/03/19  04:23    


 


Estimated GFR  37 ml/min  12/03/19  04:23    


 


BUN/Creatinine Ratio  24 %  12/03/19  04:23    


 


Glucose  154 mg/dL ()  H  12/03/19  04:23    


 


Calcium  8.3 mg/dL (8.4-10.2)  L  12/03/19  04:23    


 


Total Bilirubin  0.90 mg/dL (0.1-1.2)   12/02/19  06:58    


 


AST  55 units/L (5-40)  H  12/02/19  06:58    


 


ALT  36 units/L (7-56)   12/02/19  06:58    


 


Alkaline Phosphatase  62 units/L ()   12/02/19  06:58    


 


Ammonia  30.0 umol/L (25-60)   12/02/19  06:58    


 


Troponin T  1.160 ng/mL (0.00-0.029)  H*  12/03/19  04:23    


 


NT-Pro-B Natriuret Pep  4210 pg/mL (0-900)  H  12/02/19  07:25    


 


Total Protein  7.0 g/dL (6.3-8.2)   12/02/19  06:58    


 


Albumin  4.2 g/dL (3.9-5)   12/02/19  06:58    


 


Albumin/Globulin Ratio  1.5 %  12/02/19  06:58    


 


Triglycerides  46 mg/dL (2-149)   12/02/19  06:58    


 


Cholesterol  114 mg/dL ()   12/02/19  06:58    


 


LDL Cholesterol Direct  68 mg/dL ()   12/02/19  06:58    


 


HDL Cholesterol  51 mg/dL (40-59)   12/02/19  06:58    


 


Cholesterol/HDL Ratio  2.23 %  12/02/19  06:58    


 


TSH  1.590 mlU/mL (0.270-4.200)   12/02/19  06:58    


 


Blood Type  O POSITIVE   12/02/19  08:40    


 


Antibody Screen  Negative   12/02/19  08:40    


 


Crossmatch  See Detail   12/02/19  08:40    














Active Medications





- Current Medications


Current Medications: 














Generic Name Dose Route Start Last Admin





  Trade Name Freq  PRN Reason Stop Dose Admin


 


Acetaminophen  650 mg  12/02/19 15:40 





  Tylenol  PO  





  Q4H PRN  





  Pain MILD(1-3)/Fever >100.5/HA  


 


Atorvastatin Calcium  10 mg  12/02/19 22:00  12/02/19 22:33





  Atorvastatin  PO   10 mg





  QHS [facility]   Administration


 


Heparin Sodium (Porcine)  5,000 unit  12/02/19 22:00  12/03/19 08:06





  Heparin  SUB-Q   Not Given





  Q8HR [facility]  


 


Metoprolol Tartrate  12.5 mg  12/02/19 22:00  12/03/19 11:25





  Metoprolol  PO   12.5 mg





  BID [facility]   Administration


 


Ondansetron HCl  4 mg  12/02/19 15:40 





  Zofran  IV  





  Q8H PRN  





  Nausea And Vomiting  


 


Sodium Chloride  10 ml  12/02/19 22:00  12/03/19 11:26





  Sodium Chloride Flush Syringe 10 Ml  IV   10 ml





  BID [facility]   Administration


 


Sodium Chloride  10 ml  12/02/19 15:40  12/02/19 17:48





  Sodium Chloride Flush Syringe 10 Ml  IV   10 ml





  PRN PRN   Administration





  LINE FLUSH

## 2019-12-04 NOTE — CONSULTATION
History of Present Illness


Consult date: 12/04/19


Requesting physician: KELLY CASTELLON


Reason for consult: COPD, hypoxemia


History of present illness: 





73 y/o male with known COPD, continued tobacco abuse and questionable compliance

admitted with acute on chronic respiratory failure most likely secondary to 

pulmonary edema.  Required advanced forms of oxygen delivery but now weaned down

to nasal cannula and stable.





Past History


Past Medical History: COPD, diabetes, hypertension, other (hx of colon ca w/ 

liver mets s/p resection, MDS)


Past Surgical History: No surgical history


Social history: smoking


Family history: no significant family history





Medications and Allergies


                                    Allergies











Allergy/AdvReac Type Severity Reaction Status Date / Time


 


morphine AdvReac  HALLUCINATI Verified 12/18/18 16:36





   ONS  


 


IVP DYE AdvReac  Hives Uncoded 12/18/18 16:36











                                Home Medications











 Medication  Instructions  Recorded  Confirmed  Last Taken  Type


 


Aspirin EC [Aspirin Enteric Coated 81 mg PO QDAY 05/05/16 12/02/19 10 Days Ago 

History





TAB]    ~12/17/18 


 


Metoprolol [Lopressor] 100 mg PO DAILY 05/05/16 12/04/19 12/27/18 04:30 History


 


Pravastatin Sodium [Pravastatin] 20 mg PO QHS 05/05/16 12/04/19 12/27/18 04:30 

History


 


amLODIPine [Norvasc] 5 mg PO DAILY 05/05/16 12/02/19 12/27/18 04:30 History


 


ALBUTEROL Inhaler(NF) [VENTOLIN 2 puff IH QDAY PRN 12/18/18 12/02/19 Unknown 

History





Inhaler(NF)]     


 


Fluticasone [Flonase] 1 spray NS QDAY 12/18/18 12/04/19 12/26/18 History


 


Glucosam-Chondro-Herb 149-Hyal 1,500 mg PO BID 12/18/18 12/02/19 10 Days Ago 

History





[Glucosamine-Chondr Complex Tab]    ~12/17/18 


 


Multivit-Min/FA/Lycopen/Lutein 1 each PO DAILY 12/18/18 12/04/19 10 Days Ago 

History





[Centrum Silver Tablet]    ~12/17/18 


 


Omega-3 Fatty Acids/Fish Oil [Fish 1 each PO DAILY 12/18/18 12/04/19 10 Days Ago

 History





Oil 1,000 mg Capsule]    ~12/17/18 


 


Umeclidinium Brm/Vilanterol Tr 1 each IH DAILY 12/18/18 12/04/19 12/27/18 04:30 

History





[Anoro Ellipta 62.5-25 Mcg INH]     


 


Metoprolol Succinate [Toprol Xl] 100 mg PO DAILY 12/02/19 12/04/19 Unknown 

History


 


Multivit-Min/FA/Lycopen/Lutein 1 each PO DAILY 12/02/19 12/04/19 Unknown History





[Centrum Silver Tablet]     











Active Meds: 


Active Medications





Acetaminophen (Tylenol)  650 mg PO Q4H PRN


   PRN Reason: Pain MILD(1-3)/Fever >100.5/HA


Atorvastatin Calcium (Atorvastatin)  10 mg PO QHS [facility]


   Last Admin: 12/03/19 22:19 Dose:  10 mg


   Documented by: 


Ceftriaxone Sodium (Rocephin/Ns 1 Gm/50 Ml)  1 gm in 50 mls @ 100 mls/hr IV Q24H

[facility]


   Last Infusion: 12/04/19 13:30 Dose:  Infused


   Documented by: 


Metoprolol Tartrate (Metoprolol)  12.5 mg PO BID [facility]


   Last Admin: 12/04/19 09:25 Dose:  12.5 mg


   Documented by: 


Ondansetron HCl (Zofran)  4 mg IV Q8H PRN


   PRN Reason: Nausea And Vomiting


Sodium Chloride (Sodium Chloride Flush Syringe 10 Ml)  10 ml IV BID [facility]


   Last Admin: 12/04/19 09:26 Dose:  10 ml


   Documented by: 


Sodium Chloride (Sodium Chloride Flush Syringe 10 Ml)  10 ml IV PRN PRN


   PRN Reason: LINE FLUSH


   Last Admin: 12/02/19 17:48 Dose:  10 ml


   Documented by: 











Review of Systems


All systems: negative





Physical Examination


Vital signs: 


                                   Vital Signs











Pulse Resp Pulse Ox


 


 116 H  26 H  100 


 


 12/02/19 06:50  12/02/19 06:50  12/02/19 06:50











General appearance: no acute distress, alert


ENT: other (poor dentition)


Neck: supple


Ascultation: Bilateral: rales (at the bases)


Percussion: Bilateral: not dull


Tactile fremitus: Bilateral: normal


Cardiovascular: regular rate and rhythm


Gastrointestinal: normoactive bowel sounds





Results





- Laboratory Findings


CBC and BMP: 


                                 12/04/19 03:42





                                 12/04/19 03:42


ABG











POC ABG pH  7.072  (7.35-7.45)  L  12/02/19  06:58    


 


POC ABG pCO2  47.1  (35-45)  H  12/02/19  06:58    


 


POC ABG pO2  327  ()  H  12/02/19  06:58    


 


POC ABG HCO3  13.7  (22-26 mml/L)   12/02/19  06:58    


 


POC ABG Total CO2  15  (23-27mmol/L)   12/02/19  06:58    


 


POC ABG O2 Sat  100   12/02/19  06:58    





PT/INR, D-dimer











D-Dimer  891.58 ng/mlDDU (0-234)  H  12/02/19  06:58    








Abnormal lab findings: 


                                  Abnormal Labs











  12/02/19 12/02/19 12/02/19





  06:58 06:58 06:58


 


WBC   


 


RBC  1.99 L  


 


Hgb  6.3 L  


 


Hct  21.1 L  


 


MCV  106 H  


 


MCHC  30 L  


 


RDW  30.0 H  


 


Plt Count  95 L  


 


Seg Neuts % (Manual)   


 


Basophils % (Manual)  2.0 H  


 


Nucleated RBC %  7.0 H  


 


Seg Neutrophils # Man  0.0 L  


 


Lymphocytes # (Manual)  0.0 L  


 


D-Dimer   891.58 H 


 


POC ABG pH   


 


POC ABG pCO2   


 


POC ABG pO2   


 


Chloride   


 


Carbon Dioxide    15 L


 


BUN    30 H


 


Creatinine    1.8 H


 


Glucose    259 H


 


Calcium   


 


AST    55 H


 


Troponin T    0.118 H*


 


NT-Pro-B Natriuret Pep   


 


Urine WBC (Auto)   


 


Urine Creatinine   


 


Urine Total Protein   


 


Crossmatch   














  12/02/19 12/02/19 12/02/19





  06:58 07:25 08:40


 


WBC   


 


RBC   


 


Hgb   


 


Hct   


 


MCV   


 


MCHC   


 


RDW   


 


Plt Count   


 


Seg Neuts % (Manual)   


 


Basophils % (Manual)   


 


Nucleated RBC %   


 


Seg Neutrophils # Man   


 


Lymphocytes # (Manual)   


 


D-Dimer   


 


POC ABG pH  7.072 L  


 


POC ABG pCO2  47.1 H  


 


POC ABG pO2  327 H  


 


Chloride   


 


Carbon Dioxide   


 


BUN   


 


Creatinine   


 


Glucose   


 


Calcium   


 


AST   


 


Troponin T   


 


NT-Pro-B Natriuret Pep   4210 H 


 


Urine WBC (Auto)   


 


Urine Creatinine   


 


Urine Total Protein   


 


Crossmatch    See Detail














  12/02/19 12/02/19 12/03/19





  14:51 18:26 04:23


 


WBC   


 


RBC   


 


Hgb   


 


Hct   


 


MCV   


 


MCHC   


 


RDW   


 


Plt Count   


 


Seg Neuts % (Manual)   


 


Basophils % (Manual)   


 


Nucleated RBC %   


 


Seg Neutrophils # Man   


 


Lymphocytes # (Manual)   


 


D-Dimer   


 


POC ABG pH   


 


POC ABG pCO2   


 


POC ABG pO2   


 


Chloride   


 


Carbon Dioxide   


 


BUN    44 H


 


Creatinine    1.8 H


 


Glucose    154 H


 


Calcium    8.3 L


 


AST   


 


Troponin T  0.579 H* D  1.150 H* D  1.160 H*


 


NT-Pro-B Natriuret Pep   


 


Urine WBC (Auto)   


 


Urine Creatinine   


 


Urine Total Protein   


 


Crossmatch   














  12/03/19 12/03/19 12/03/19





  07:01 19:55 19:55


 


WBC  2.3 L  


 


RBC  2.43 L  


 


Hgb  7.6 L  


 


Hct  22.9 L  


 


MCV  95 H  


 


MCHC   


 


RDW  23.8 H  


 


Plt Count  41 L  


 


Seg Neuts % (Manual)  71.0 H  


 


Basophils % (Manual)   


 


Nucleated RBC %  1.0 H  


 


Seg Neutrophils # Man  1.6 L  


 


Lymphocytes # (Manual)  0.4 L  


 


D-Dimer   


 


POC ABG pH   


 


POC ABG pCO2   


 


POC ABG pO2   


 


Chloride   


 


Carbon Dioxide   


 


BUN   


 


Creatinine   


 


Glucose   


 


Calcium   


 


AST   


 


Troponin T   


 


NT-Pro-B Natriuret Pep   


 


Urine WBC (Auto)   21.0 H 


 


Urine Creatinine    75.4 H


 


Urine Total Protein    25 H


 


Crossmatch   














  12/04/19 12/04/19





  03:42 03:42


 


WBC  2.6 L 


 


RBC  2.46 L 


 


Hgb  7.8 L 


 


Hct  23.6 L 


 


MCV  96 H 


 


MCHC  


 


RDW  24.0 H 


 


Plt Count  52 L 


 


Seg Neuts % (Manual)  


 


Basophils % (Manual)  


 


Nucleated RBC %  


 


Seg Neutrophils # Man  


 


Lymphocytes # (Manual)  


 


D-Dimer  


 


POC ABG pH  


 


POC ABG pCO2  


 


POC ABG pO2  


 


Chloride   107.9 H


 


Carbon Dioxide  


 


BUN   47 H


 


Creatinine   1.6 H


 


Glucose   130 H


 


Calcium  


 


AST  


 


Troponin T  


 


NT-Pro-B Natriuret Pep  


 


Urine WBC (Auto)  


 


Urine Creatinine  


 


Urine Total Protein  


 


Crossmatch  














- Diagnostic Findings


Chest x-ray: image reviewed (Does appear to be pulmonary edema on CXR)





Assessment and Plan





73 y/o male with known COPD admitted with worsening shortness of breath, acute 

on chronic respiratory failure most likely secondary to pulmonary edema with low

lung reserve and also pancyotopenia.





1.  Do not feel in overt COPD exacerbation so no need for systemic steroids.


2.  Smoking cessation counseling again at bedside


3.  Has home O2 already


4.  Not overly concerned about pneumonia given rapid improvement.  Suggest 

checking calcitonin.  If normal or negative, would stop abx


5.  Has responded well to lasix therapy.


6.  Will add PRN neb therapy.

## 2019-12-04 NOTE — PROGRESS NOTE
Assessment and Plan


Cont present conservative cardiac management at this time. Consider ischemic 

evaluation once medically stabilized as OP.





The patient has been seen in conjunction with Dr. ALONDRA Owens who agrees with the 

assessment and plan of care.  








- Patient Problems


(1) Acute and chronic respiratory failure


Current Visit: Yes   Status: Acute   





(2) COPD (chronic obstructive pulmonary disease)


Current Visit: Yes   Status: Chronic   





(3) Symptomatic anemia


Current Visit: Yes   Status: Acute   





(4) Altered mental status


Current Visit: Yes   Status: Resolved   





(5) Thrombocytopenia


Current Visit: Yes   Status: Acute   





(6) NSTEMI (non-ST elevated myocardial infarction)


Current Visit: Yes   Status: Acute   


Plan to address problem: 


type II








(7) Abnormal ECG


Current Visit: Yes   Status: Acute   





(8) PRAKASH (acute kidney injury)


Current Visit: Yes   Status: Acute   





(9) Diabetes


Current Visit: Yes   Status: Chronic   





(10) Myelodysplastic syndrome


Current Visit: Yes   Status: Chronic   





(11) History of colon cancer


Current Visit: Yes   Status: Chronic   





(12) Tobacco use


Current Visit: Yes   Status: Chronic   





Subjective


Date of service: 12/04/19


Principal diagnosis: COPD exac; resp failure; anemia; MDS; NSTEMI


Interval history: 


pt resting in bed, states he is feeling well. in ST HR 100s on tele, no current 

cardiac complaints. family at bedside.








Objective





                                Last Vital Signs











Temp  99.0 F   12/04/19 12:00


 


Pulse  107 H  12/04/19 09:25


 


Resp  29 H  12/04/19 07:29


 


BP  124/83   12/04/19 09:25


 


Pulse Ox  99   12/04/19 10:00

















- Physical Examination


General: No Apparent Distress


HEENT: Positive: PERRL, Normocephaly, Mucus Membranes Moist


Neck: Positive: neck supple, trachea midline


Cardiac: Positive: Regular Rhythm, S1/S2


Lungs: Positive: Decreased Breath Sounds, Oxygen


Neuro: Positive: Grossly Intact


Abdomen: Negative: Tender


Skin: Negative: Rash


Musculoskeletal: No Pain


Extremities: Present: edema (trace BLE)





- Labs and Meds


                                       CBC











  12/04/19 Range/Units





  03:42 


 


WBC  2.6 L  (4.5-11.0)  K/mm3


 


RBC  2.46 L  (3.65-5.03)  M/mm3


 


Hgb  7.8 L  (11.8-15.2)  gm/dl


 


Hct  23.6 L  (35.5-45.6)  %


 


Plt Count  52 L  (140-440)  K/mm3








                          Comprehensive Metabolic Panel











  12/04/19 Range/Units





  03:42 


 


Sodium  143  (137-145)  mmol/L


 


Potassium  4.0  (3.6-5.0)  mmol/L


 


Chloride  107.9 H  ()  mmol/L


 


Carbon Dioxide  23  (22-30)  mmol/L


 


BUN  47 H  (9-20)  mg/dL


 


Creatinine  1.6 H  (0.8-1.5)  mg/dL


 


Glucose  130 H  ()  mg/dL


 


Calcium  8.4  (8.4-10.2)  mg/dL














- Imaging and Cardiology


EKG: report reviewed, image reviewed


Echo: report reviewed (11/2019: EF 50-55%, pseudonormalization, mild AR, mild 

AS, mod MR, mod pulm HTN. 5/2011 showed EF 50-55%, mild LVH, mild MR, mild AR, 

trivial TR. )





- Telemetry


EKG Rhythm: Sinus Rhythm





- EKG


Sinus rhythms and dysrhythmias: sinus rhythm

## 2019-12-04 NOTE — PROGRESS NOTE
Assessment and Plan


Assessment and plan: 





Acute hypoxic resp failure.  Etiology likely sec heart failure.  CXR with 

bilateral interstitial opacities and elevated BNP.  Check Echo





Elevated trop.  Cards consulted.  Check ECHO





MDS.  Pt with recent dx of MDS and is followed by Devin Wolfe.  Heme consult





ARf.  Nephrology consulted





Anemia.  Tansfusee 2 units PRBCs





History


Interval history: 





No new issues overnight





Hospitalist Physical





- Constitutional


Vitals: 


                                        











Temp Pulse Resp BP Pulse Ox


 


 99.2 F   90   26 H  114/49   99 


 


 12/04/19 16:00  12/04/19 18:00  12/04/19 18:00  12/04/19 18:00  12/04/19 18:00











General appearance: Present: mild distress (sob)





- EENT


Eyes: Present: PERRL, EOM intact


ENT: hearing intact, clear oral mucosa, dentition normal





- Neck


Neck: Present: supple, normal ROM





- Respiratory


Respiratory effort: normal


Respiratory: bilateral: CTA





- Cardiovascular


Rhythm: regular


Heart Sounds: Present: S1 & S2.  Absent: gallop, rub





- Extremities


Extremities: no ischemia, No edema, Full ROM





- Abdominal


General gastrointestinal: soft, non-tender, non-distended, normal bowel sounds





- Integumentary


Integumentary: Present: clear, warm, dry





- Neurologic


Neurologic: CNII-XII intact, moves all extremities





Results





- Labs


CBC & Chem 7: 


                                 12/04/19 03:42





                                 12/04/19 03:42


Labs: 


                             Laboratory Last Values











WBC  2.6 K/mm3 (4.5-11.0)  L  12/04/19  03:42    


 


RBC  2.46 M/mm3 (3.65-5.03)  L  12/04/19  03:42    


 


Hgb  7.8 gm/dl (11.8-15.2)  L  12/04/19  03:42    


 


Hct  23.6 % (35.5-45.6)  L  12/04/19  03:42    


 


MCV  96 fl (84-94)  H  12/04/19  03:42    


 


MCH  32 pg (28-32)   12/04/19  03:42    


 


MCHC  33 % (32-34)   12/04/19  03:42    


 


RDW  24.0 % (13.2-15.2)  H  12/04/19  03:42    


 


Plt Count  52 K/mm3 (140-440)  L  12/04/19  03:42    


 


Add Manual Diff  Complete   12/03/19  07:01    


 


Total Counted  100   12/03/19  07:01    


 


Seg Neuts % (Manual)  71.0 % (40.0-70.0)  H  12/03/19  07:01    


 


Band Neutrophils %  5.0 %  12/03/19  07:01    


 


Lymphocytes % (Manual)  17.0 % (13.4-35.0)   12/03/19  07:01    


 


Reactive Lymphs % (Man)  0 %  12/03/19  07:01    


 


Monocytes % (Manual)  7.0 % (0.0-7.3)   12/03/19  07:01    


 


Eosinophils % (Manual)  0 % (0.0-4.3)   12/03/19  07:01    


 


Basophils % (Manual)  0 % (0.0-1.8)   12/03/19  07:01    


 


Metamyelocytes %  0 %  12/03/19  07:01    


 


Myelocytes %  0 %  12/03/19  07:01    


 


Promyelocytes %  0 %  12/03/19  07:01    


 


Blast Cells %  0 %  12/03/19  07:01    


 


Nucleated RBC %  1.0 % (0.0-0.9)  H  12/03/19  07:01    


 


Seg Neutrophils # Man  1.6 K/mm3 (1.8-7.7)  L  12/03/19  07:01    


 


Band Neutrophils #  0.1 K/mm3  12/03/19  07:01    


 


Lymphocytes # (Manual)  0.4 K/mm3 (1.2-5.4)  L  12/03/19  07:01    


 


Abs React Lymphs (Man)  0.0 K/mm3  12/03/19  07:01    


 


Monocytes # (Manual)  0.2 K/mm3 (0.0-0.8)   12/03/19  07:01    


 


Eosinophils # (Manual)  0.0 K/mm3 (0.0-0.4)   12/03/19  07:01    


 


Basophils # (Manual)  0.0 K/mm3 (0.0-0.1)   12/03/19  07:01    


 


Metamyelocytes #  0.0 K/mm3  12/03/19  07:01    


 


Myelocytes #  0.0 K/mm3  12/03/19  07:01    


 


Promyelocytes #  0.0 K/mm3  12/03/19  07:01    


 


Blast Cells #  0.0 K/mm3  12/03/19  07:01    


 


WBC Morphology  Not Reportable   12/03/19  07:01    


 


Hypersegmented Neuts  Not Reportable   12/03/19  07:01    


 


Hyposegmented Neuts  Not Reportable   12/03/19  07:01    


 


Hypogranular Neuts  Not Reportable   12/03/19  07:01    


 


Smudge Cells  Not Reportable   12/03/19  07:01    


 


Toxic Granulation  Not Reportable   12/03/19  07:01    


 


Toxic Vacuolation  Not Reportable   12/03/19  07:01    


 


Dohle Bodies  Not Reportable   12/03/19  07:01    


 


Pelger-Huet Anomaly  Not Reportable   12/03/19  07:01    


 


Gasper Rods  Not Reportable   12/03/19  07:01    


 


Platelet Estimate  Consistent w auto   12/03/19  07:01    


 


Clumped Platelets  Not Reportable   12/03/19  07:01    


 


Plt Clumps, EDTA  Not Reportable   12/03/19  07:01    


 


Large Platelets  Not Reportable   12/03/19  07:01    


 


Giant Platelets  Not Reportable   12/03/19  07:01    


 


Platelet Satelliting  Not Reportable   12/03/19  07:01    


 


Plt Morphology Comment  Not Reportable   12/03/19  07:01    


 


RBC Morphology  Not Reportable   12/03/19  07:01    


 


Dimorphic RBCs  Not Reportable   12/03/19  07:01    


 


Polychromasia  Few   12/03/19  07:01    


 


Hypochromasia  1+   12/03/19  07:01    


 


Poikilocytosis  Not Reportable   12/03/19  07:01    


 


Anisocytosis  2+   12/03/19  07:01    


 


Microcytosis  Not Reportable   12/03/19  07:01    


 


Macrocytosis  1+   12/03/19  07:01    


 


Spherocytes  Not Reportable   12/03/19  07:01    


 


Pappenheimer Bodies  Not Reportable   12/03/19  07:01    


 


Sickle Cells  Not Reportable   12/03/19  07:01    


 


Target Cells  Not Reportable   12/03/19  07:01    


 


Tear Drop Cells  Few   12/03/19  07:01    


 


Ovalocytes  1+   12/03/19  07:01    


 


Helmet Cells  Not Reportable   12/03/19  07:01    


 


[place] Bodies  Not Reportable   12/03/19  07:01    


 


Cabot Rings  Not Reportable   12/03/19  07:01    


 


Estee Cells  Not Reportable   12/03/19  07:01    


 


Bite Cells  Not Reportable   12/03/19  07:01    


 


Crenated Cell  Not Reportable   12/03/19  07:01    


 


Elliptocytes  Not Reportable   12/03/19  07:01    


 


Acanthocytes (Spur)  Not Reportable   12/03/19  07:01    


 


Rouleaux  Not Reportable   12/03/19  07:01    


 


Hemoglobin C Crystals  Not Reportable   12/03/19  07:01    


 


Schistocytes  Rare   12/03/19  07:01    


 


Malaria parasites  Not Reportable   12/03/19  07:01    


 


[place]if Bodies  Not Reportable   12/03/19  07:01    


 


Hem Pathologist Commnt  No   12/03/19  07:01    


 


D-Dimer  891.58 ng/mlDDU (0-234)  H  12/02/19  06:58    


 


POC ABG pH  7.072  (7.35-7.45)  L  12/02/19  06:58    


 


POC ABG pCO2  47.1  (35-45)  H  12/02/19  06:58    


 


POC ABG pO2  327  ()  H  12/02/19  06:58    


 


POC ABG HCO3  13.7  (22-26 mml/L)   12/02/19  06:58    


 


POC ABG Total CO2  15  (23-27mmol/L)   12/02/19  06:58    


 


POC ABG O2 Sat  100   12/02/19  06:58    


 


POC ABG Base Excess  -16  ((-2) - (+3)mmol/L)   12/02/19  06:58    


 


FiO2  100 %  12/02/19  06:58    


 


Sodium  143 mmol/L (137-145)   12/04/19  03:42    


 


Potassium  4.0 mmol/L (3.6-5.0)   12/04/19  03:42    


 


Chloride  107.9 mmol/L ()  H  12/04/19  03:42    


 


Carbon Dioxide  23 mmol/L (22-30)   12/04/19  03:42    


 


Anion Gap  16 mmol/L  12/04/19  03:42    


 


BUN  47 mg/dL (9-20)  H  12/04/19  03:42    


 


Creatinine  1.6 mg/dL (0.8-1.5)  H  12/04/19  03:42    


 


Estimated GFR  42 ml/min  12/04/19  03:42    


 


BUN/Creatinine Ratio  29 %  12/04/19  03:42    


 


Glucose  130 mg/dL ()  H  12/04/19  03:42    


 


Calcium  8.4 mg/dL (8.4-10.2)   12/04/19  03:42    


 


Total Bilirubin  0.90 mg/dL (0.1-1.2)   12/02/19  06:58    


 


AST  55 units/L (5-40)  H  12/02/19  06:58    


 


ALT  36 units/L (7-56)   12/02/19  06:58    


 


Alkaline Phosphatase  62 units/L ()   12/02/19  06:58    


 


Ammonia  30.0 umol/L (25-60)   12/02/19  06:58    


 


Troponin T  1.160 ng/mL (0.00-0.029)  H*  12/03/19  04:23    


 


NT-Pro-B Natriuret Pep  4210 pg/mL (0-900)  H  12/02/19  07:25    


 


Total Protein  7.0 g/dL (6.3-8.2)   12/02/19  06:58    


 


Albumin  4.2 g/dL (3.9-5)   12/02/19  06:58    


 


Albumin/Globulin Ratio  1.5 %  12/02/19  06:58    


 


Triglycerides  46 mg/dL (2-149)   12/02/19  06:58    


 


Cholesterol  114 mg/dL ()   12/02/19  06:58    


 


LDL Cholesterol Direct  68 mg/dL ()   12/02/19  06:58    


 


HDL Cholesterol  51 mg/dL (40-59)   12/02/19  06:58    


 


Cholesterol/HDL Ratio  2.23 %  12/02/19  06:58    


 


TSH  1.590 mlU/mL (0.270-4.200)   12/02/19  06:58    


 


Urine Color  Yellow  (Yellow)   12/03/19  19:55    


 


Urine Turbidity  Clear  (Clear)   12/03/19  19:55    


 


Urine pH  5.0  (5.0-7.0)   12/03/19  19:55    


 


Ur Specific Gravity  1.014  (1.003-1.030)   12/03/19  19:55    


 


Urine Protein  30 mg/dl mg/dL (Negative)   12/03/19  19:55    


 


Urine Glucose (UA)  Neg mg/dL (Negative)   12/03/19  19:55    


 


Urine Ketones  Neg mg/dL (Negative)   12/03/19  19:55    


 


Urine Blood  Sm  (Negative)   12/03/19  19:55    


 


Urine Nitrite  Neg  (Negative)   12/03/19  19:55    


 


Urine Bilirubin  Neg  (Negative)   12/03/19  19:55    


 


Urine Urobilinogen  < 2.0 mg/dL (<2.0)   12/03/19  19:55    


 


Ur Leukocyte Esterase  Sm  (Negative)   12/03/19  19:55    


 


Urine WBC (Auto)  21.0 /HPF (0.0-6.0)  H  12/03/19  19:55    


 


Urine RBC (Auto)  4.0 /HPF (0.0-6.0)   12/03/19  19:55    


 


U Epithel Cells (Auto)  < 1.0 /HPF (0-13.0)   12/03/19  19:55    


 


Urine Mucus  Few /HPF  12/03/19  19:55    


 


Urine Creatinine  75.4 mg/dL (0.1-20.0)  H  12/03/19  19:55    


 


Protein/Creatinin Ratio  0.33   12/03/19  19:55    


 


Urine Sodium  45 mmol/L  12/03/19  19:55    


 


Urine Total Protein  25 mg/dL (5-11.8)  H  12/03/19  19:55    


 


Blood Type  O POSITIVE   12/02/19  08:40    


 


Antibody Screen  Negative   12/02/19  08:40    


 


Crossmatch  See Detail   12/02/19  08:40    














Active Medications





- Current Medications


Current Medications: 














Generic Name Dose Route Start Last Admin





  Trade Name Freq  PRN Reason Stop Dose Admin


 


Acetaminophen  650 mg  12/02/19 15:40 





  Tylenol  PO  





  Q4H PRN  





  Pain MILD(1-3)/Fever >100.5/HA  


 


Albuterol  2.5 mg  12/04/19 16:38 





  Proventil  IH  





  Q4HRT PRN  





  Shortness Of Breath  


 


Atorvastatin Calcium  10 mg  12/02/19 22:00  12/03/19 22:19





  Atorvastatin  PO   10 mg





  QHS LINUS   Administration


 


Ceftriaxone Sodium  1 gm in 50 mls @ 100 mls/hr  12/04/19 13:00  12/04/19 13:30





  Rocephin/Ns 1 Gm/50 Ml  IV   Infused





  Q24H LINUS   Infusion


 


Metoprolol Tartrate  12.5 mg  12/02/19 22:00  12/04/19 09:25





  Metoprolol  PO   12.5 mg





  BID LINUS   Administration


 


Ondansetron HCl  4 mg  12/02/19 15:40 





  Zofran  IV  





  Q8H PRN  





  Nausea And Vomiting  


 


Sodium Chloride  10 ml  12/02/19 22:00  12/04/19 09:26





  Sodium Chloride Flush Syringe 10 Ml  IV   10 ml





  BID LINUS   Administration


 


Sodium Chloride  10 ml  12/02/19 15:40  12/02/19 17:48





  Sodium Chloride Flush Syringe 10 Ml  IV   10 ml





  PRN PRN   Administration





  LINE FLUSH

## 2019-12-04 NOTE — ULTRASOUND REPORT
Renal ultrasound. 12/4/2019.



HISTORY: Acute renal insufficiency.



FINDINGS: Right kidney measures 12.6 cm. Cortex is normal measuring 2 cm. A lower pole cyst measures 
2.8 cm.



Left kidney measures 11.8 cm. Cortex measures 1.3 cm. Nonobstructing stones at the lower pole measure
 9 and 11 mm.



Cortical echogenicity is normal. Negative for mass or obstruction.



The bladder contains moderate urine.



IMPRESSION:

1. Benign-appearing cyst lower pole right kidney.

2. Nonobstructing left renal stones.



Signer Name: Pelon Thomas MD 

Signed: 12/4/2019 4:56 PM

 Workstation Name: [id number]

## 2019-12-04 NOTE — PROGRESS NOTE
Assessment and Plan





Impression:


* Nonoliguric PRAKASH vs underlying chronic kidney disease


   --SCr 1.0mg/dL in Sept 2018


   --UPCR 330mg (Nov 2019)


* NSTEMI


* Acute hypoxic/hypercapnic respiratory failure


* Pyruia/hematuria r/o UTI


* COPD


* Myelodysplastic syndrome


* Type II DM


* Hypertension





Plan:


* Renal function improved, SCr trending down; baseline renal function unknown


* Renal u/s pending


* Follow up on urine cx; will give Rocephin 1g IV pending results


* Lasix 40mg IV x 1 dose today


* Cardiology recommendations noted - TTE pending


* Pulmonary consultation pending


* Avoid potential nephrotoxins


* Dose medications for renal function


* 2g Na restricted diet


* AM labs ordered


* Strict I/O





Subjective


Date of service: 12/04/19


Principal diagnosis: COPD exac; resp failure; anemia; MDS; NSTEMI


Interval history: 





Patient currently on 4L NC oxygen.  Complains of dysuria today.





Objective





- Vital Signs


Vital signs: 


                               Vital Signs - 12hr











  12/04/19 12/04/19 12/04/19





  01:00 02:00 02:25


 


Temperature   


 


Pulse Rate 89 89 129 H


 


Pulse Rate [   





From Monitor]   


 


Respiratory 19 20 28 H





Rate   


 


Blood Pressure 127/55 121/54 


 


O2 Sat by Pulse 100 98 97





Oximetry   














  12/04/19 12/04/19 12/04/19





  03:00 04:00 05:00


 


Temperature  97.7 F 


 


Pulse Rate 83 97 H 95 H


 


Pulse Rate [  110 H 





From Monitor]   


 


Respiratory 17 18 34 H





Rate   


 


Blood Pressure 121/53 116/41 116/41


 


O2 Sat by Pulse 99 100 99





Oximetry   














  12/04/19 12/04/19 12/04/19





  06:00 07:00 07:29


 


Temperature   


 


Pulse Rate 88 209 H 191 H


 


Pulse Rate [   





From Monitor]   


 


Respiratory 19 29 H 29 H





Rate   


 


Blood Pressure   116/41


 


O2 Sat by Pulse 98 98 99





Oximetry   














  12/04/19 12/04/19 12/04/19





  07:45 09:25 10:00


 


Temperature 98.2 F  


 


Pulse Rate  107 H 


 


Pulse Rate [   





From Monitor]   


 


Respiratory   





Rate   


 


Blood Pressure  124/83 


 


O2 Sat by Pulse   99





Oximetry   














  12/04/19





  12:00


 


Temperature 99.0 F


 


Pulse Rate 


 


Pulse Rate [ 





From Monitor] 


 


Respiratory 





Rate 


 


Blood Pressure 


 


O2 Sat by Pulse 





Oximetry 














- General Appearance


General appearance: well-developed, well-nourished


EENT: ATNC


Respiratory: Present: Wheezes


Cardiology: regular, S1S2


Gastrointestinal: normal, no tenderness, no distended


Integumentary: no rash, warm and dry


Musculoskeletal: other (no edema)


Psychiatric: cooperative





- Lab





                                 12/04/19 03:42





                                 12/04/19 03:42


                             Most recent lab results











Calcium  8.4 mg/dL (8.4-10.2)   12/04/19  03:42    


 


Urine Creatinine  75.4 mg/dL (0.1-20.0)  H  12/03/19  19:55    


 


Urine Sodium  45 mmol/L  12/03/19  19:55    


 


Urine Total Protein  25 mg/dL (5-11.8)  H  12/03/19  19:55    














Medications & Allergies





- Medications


Allergies/Adverse Reactions: 


                                    Allergies





morphine Adverse Reaction (Verified 12/18/18 16:36)


   HALLUCINATIONS


IVP DYE Adverse Reaction (Uncoded 12/18/18 16:36)


   Hives








Home Medications: 


                                Home Medications











 Medication  Instructions  Recorded  Confirmed  Last Taken  Type


 


Aspirin EC [Aspirin Enteric Coated 81 mg PO QDAY 05/05/16 12/02/19 10 Days Ago 

History





TAB]    ~12/17/18 


 


Metoprolol [Lopressor] 100 mg PO DAILY 05/05/16 12/18/18 12/27/18 04:30 History


 


Pravastatin Sodium [Pravastatin] 20 mg PO QHS 05/05/16 12/18/18 12/27/18 04:30 

History


 


amLODIPine [Norvasc] 5 mg PO DAILY 05/05/16 12/02/19 12/27/18 04:30 History


 


ALBUTEROL Inhaler(NF) [VENTOLIN 2 puff IH QDAY PRN 12/18/18 12/02/19 Unknown 

History





Inhaler(NF)]     


 


Fluticasone [Flonase] 1 spray NS QDAY 12/18/18 12/27/18 12/26/18 History


 


Glucosam-Chondro-Herb 149-Hyal 1,500 mg PO BID 12/18/18 12/02/19 10 Days Ago 

History





[Glucosamine-Chondr Complex Tab]    ~12/17/18 


 


Multivit-Min/FA/Lycopen/Lutein 1 each PO DAILY 12/18/18 12/18/18 10 Days Ago 

History





[Centrum Silver Tablet]    ~12/17/18 


 


Omega-3 Fatty Acids/Fish Oil [Fish 1 each PO DAILY 12/18/18 12/18/18 10 Days Ago

 History





Oil 1,000 mg Capsule]    ~12/17/18 


 


Umeclidinium Brm/Vilanterol Tr 1 each IH DAILY 12/18/18 12/18/18 12/27/18 04:30 

History





[Anoro Ellipta 62.5-25 Mcg INH]     


 


Metoprolol Succinate [Toprol Xl] 100 mg PO 12/02/19  Unknown History


 


Multivit-Min/FA/Lycopen/Lutein  12/02/19  Unknown History





[Centrum Silver Tablet]     











Active Medications: 














Generic Name Dose Route Start Last Admin





  Trade Name Freq  PRN Reason Stop Dose Admin


 


Acetaminophen  650 mg  12/02/19 15:40 





  Tylenol  PO  





  Q4H PRN  





  Pain MILD(1-3)/Fever >100.5/HA  


 


Atorvastatin Calcium  10 mg  12/02/19 22:00  12/03/19 22:19





  Atorvastatin  PO   10 mg





  QHS LINUS   Administration


 


Metoprolol Tartrate  12.5 mg  12/02/19 22:00  12/04/19 09:25





  Metoprolol  PO   12.5 mg





  BID LINUS   Administration


 


Ondansetron HCl  4 mg  12/02/19 15:40 





  Zofran  IV  





  Q8H PRN  





  Nausea And Vomiting  


 


Sodium Chloride  10 ml  12/02/19 22:00  12/04/19 09:26





  Sodium Chloride Flush Syringe 10 Ml  IV   10 ml





  BID LINUS   Administration


 


Sodium Chloride  10 ml  12/02/19 15:40  12/02/19 17:48





  Sodium Chloride Flush Syringe 10 Ml  IV   10 ml





  PRN PRN   Administration





  LINE FLUSH

## 2019-12-05 NOTE — PROGRESS NOTE
Assessment and Plan


Cont present conservative cardiac management at this time. Consider ischemic 

evaluation once medically stabilized as OP.


Pt may tx out of IMCU to telemetry from cardiology standpoint. 


Await hematology consultation.





The patient has been seen in conjunction with Dr. ALONDRA Owens who agrees with the 

assessment and plan of care.  








- Patient Problems


(1) Acute and chronic respiratory failure


Current Visit: Yes   Status: Acute   





(2) COPD (chronic obstructive pulmonary disease)


Current Visit: Yes   Status: Chronic   





(3) Symptomatic anemia


Current Visit: Yes   Status: Acute   





(4) Altered mental status


Current Visit: Yes   Status: Resolved   





(5) Thrombocytopenia


Current Visit: Yes   Status: Acute   





(6) NSTEMI (non-ST elevated myocardial infarction)


Current Visit: Yes   Status: Acute   





(7) Abnormal ECG


Current Visit: Yes   Status: Acute   





(8) PRAKASH (acute kidney injury)


Current Visit: Yes   Status: Acute   





(9) Diabetes


Current Visit: Yes   Status: Chronic   





(10) Myelodysplastic syndrome


Current Visit: Yes   Status: Chronic   





(11) History of colon cancer


Current Visit: Yes   Status: Chronic   





(12) Tobacco use


Current Visit: Yes   Status: Chronic   





Subjective


Date of service: 12/05/19


Principal diagnosis: COPD exac; resp failure; anemia; MDS; NSTEMI


Interval history: 


pt resting in bed, states he is feeling well. in ST HR 100s on tele, no current 

cardiac complaints. 








Objective





                                Last Vital Signs











Temp  98.1 F   12/05/19 04:00


 


Pulse  93 H  12/05/19 10:10


 


Resp  32 H  12/05/19 00:41


 


BP  117/43   12/05/19 10:10


 


Pulse Ox  97   12/05/19 07:22

















- Physical Examination


General: No Apparent Distress


HEENT: Positive: PERRL, Normocephaly, Mucus Membranes Moist


Neck: Positive: neck supple, trachea midline


Cardiac: Positive: Reg Rate and Rhythm, S1/S2


Lungs: Positive: Decreased Breath Sounds


Neuro: Positive: Grossly Intact


Abdomen: Negative: Tender


Skin: Negative: Rash


Musculoskeletal: No Pain


Extremities: Present: edema (trace BLE)





- Labs and Meds


                                       CBC











  12/05/19 Range/Units





  04:45 


 


WBC  2.0 L  (4.5-11.0)  K/mm3


 


RBC  2.24 L  (3.65-5.03)  M/mm3


 


Hgb  7.0 L  (11.8-15.2)  gm/dl


 


Hct  21.4 L  (35.5-45.6)  %


 


Plt Count  48 L  (140-440)  K/mm3








                          Comprehensive Metabolic Panel











  12/05/19 Range/Units





  04:45 


 


Sodium  143  (137-145)  mmol/L


 


Potassium  3.7  (3.6-5.0)  mmol/L


 


Chloride  105.0  ()  mmol/L


 


Carbon Dioxide  27  (22-30)  mmol/L


 


BUN  34 H  (9-20)  mg/dL


 


Creatinine  1.5  (0.8-1.5)  mg/dL


 


Glucose  130 H  ()  mg/dL


 


Calcium  8.2 L  (8.4-10.2)  mg/dL














- Imaging and Cardiology


EKG: report reviewed, image reviewed


Echo: report reviewed (11/2019: EF 50-55%, pseudonormalization, mild AR, mild 

AS, mod MR, mod pulm HTN. 5/2011 showed EF 50-55%, mild LVH, mild MR, mild AR, 

trivial TR. )





- EKG


Sinus rhythms and dysrhythmias: sinus rhythm


Repolarization changes or abnormalities: ST or T wave suggestive of ischemia

## 2019-12-05 NOTE — PROGRESS NOTE
Assessment and Plan





Impression:


* Nonoliguric PRAKASH vs underlying chronic kidney disease


   --SCr 1.1mg/dL in Nov 2019 - per outpatient hematology labs 


   --UPCR 330mg (Dec 2019)


* NSTEMI


   --TTE (Dec 2019):  LVEF 50-55%, mild AR, mild AS, mod MR, mod pulmonary HTN, 

no pericardial effusion


* Acute hypoxic/hypercapnic respiratory failure


* Pyruia/hematuria r/o UTI


   --Urine cx:  <10k CFU


* COPD


* Myelodysplastic syndrome


* Type II DM


* Hypertension


* Nephrolithiasis, non obstructing





Plan:


* Renal function improved, SCr trending down


* Urine cx reviewed; will d/c Rocephin


* Lasix 40mg IV x 1 dose again today


* Will obtain CXR


* Cardiology recommendations noted - TTE reviewed


* Pulmonary following


* Avoid potential nephrotoxins


* Dose medications for renal function


* 2g Na restricted diet


* AM labs ordered


* Strict I/O





Subjective


Date of service: 12/05/19


Principal diagnosis: COPD exac; resp failure; anemia; MDS; NSTEMI


Interval history: 





Patient reports breathing is slightly improved.  Required bipap overnight.  Now 

on 3L. Previously on 4L.  He complains of cough





Objective





- Vital Signs


Vital signs: 


                               Vital Signs - 12hr











  12/04/19 12/04/19 12/04/19





  21:51 22:00 23:00


 


Temperature   


 


Pulse Rate 97 H 91 H 76


 


Respiratory   





Rate   


 


Blood Pressure 144/63 116/50 107/49


 


O2 Sat by Pulse  97 97





Oximetry   














  12/04/19 12/05/19 12/05/19





  23:59 00:00 00:41


 


Temperature 98.5 F  


 


Pulse Rate  76 95 H


 


Respiratory   32 H





Rate   


 


Blood Pressure  104/47 107/47


 


O2 Sat by Pulse  94 100





Oximetry   














  12/05/19 12/05/19 12/05/19





  01:00 02:00 03:00


 


Temperature   


 


Pulse Rate 75 74 73


 


Respiratory   





Rate   


 


Blood Pressure 111/50 115/49 112/40


 


O2 Sat by Pulse 97 97 96





Oximetry   














  12/05/19 12/05/19 12/05/19





  04:00 05:00 06:00


 


Temperature 98.1 F  


 


Pulse Rate  72 75


 


Respiratory   





Rate   


 


Blood Pressure 109/52 118/48 112/41


 


O2 Sat by Pulse 99 95 93





Oximetry   














  12/05/19





  07:22


 


Temperature 


 


Pulse Rate 


 


Respiratory 





Rate 


 


Blood Pressure 


 


O2 Sat by Pulse 97





Oximetry 














- General Appearance


General appearance: well-developed, well-nourished


EENT: ATNC


Respiratory: Present: Wheezes


Cardiology: regular, S1S2


Gastrointestinal: normal, no tenderness, no distended


Integumentary: no rash, warm and dry


Musculoskeletal: other (no edema)


Psychiatric: cooperative





- Lab





                                 12/05/19 04:45





                                 12/05/19 04:45


                             Most recent lab results











Calcium  8.2 mg/dL (8.4-10.2)  L  12/05/19  04:45    


 


Urine Creatinine  75.4 mg/dL (0.1-20.0)  H  12/03/19  19:55    


 


Urine Sodium  45 mmol/L  12/03/19  19:55    


 


Urine Total Protein  25 mg/dL (5-11.8)  H  12/03/19  19:55    














Medications & Allergies





- Medications


Allergies/Adverse Reactions: 


                                    Allergies





morphine Adverse Reaction (Verified 12/18/18 16:36)


   HALLUCINATIONS


IVP DYE Adverse Reaction (Uncoded 12/18/18 16:36)


   Hives








Home Medications: 


                                Home Medications











 Medication  Instructions  Recorded  Confirmed  Last Taken  Type


 


Aspirin EC [Aspirin Enteric Coated 81 mg PO QDAY 05/05/16 12/02/19 10 Days Ago 

History





TAB]    ~12/17/18 


 


Metoprolol [Lopressor] 100 mg PO DAILY 05/05/16 12/04/19 12/27/18 04:30 History


 


Pravastatin Sodium [Pravastatin] 20 mg PO QHS 05/05/16 12/04/19 12/27/18 04:30 

History


 


amLODIPine [Norvasc] 5 mg PO DAILY 05/05/16 12/02/19 12/27/18 04:30 History


 


ALBUTEROL Inhaler(NF) [VENTOLIN 2 puff IH QDAY PRN 12/18/18 12/02/19 Unknown 

History





Inhaler(NF)]     


 


Fluticasone [Flonase] 1 spray NS QDAY 12/18/18 12/04/19 12/26/18 History


 


Glucosam-Chondro-Herb 149-Hyal 1,500 mg PO BID 12/18/18 12/02/19 10 Days Ago 

History





[Glucosamine-Chondr Complex Tab]    ~12/17/18 


 


Multivit-Min/FA/Lycopen/Lutein 1 each PO DAILY 12/18/18 12/04/19 10 Days Ago 

History





[Centrum Silver Tablet]    ~12/17/18 


 


Omega-3 Fatty Acids/Fish Oil [Fish 1 each PO DAILY 12/18/18 12/04/19 10 Days Ago

 History





Oil 1,000 mg Capsule]    ~12/17/18 


 


Umeclidinium Brm/Vilanterol Tr 1 each IH DAILY 12/18/18 12/04/19 12/27/18 04:30 

History





[Anoro Ellipta 62.5-25 Mcg INH]     


 


Metoprolol Succinate [Toprol Xl] 100 mg PO DAILY 12/02/19 12/04/19 Unknown 

History


 


Multivit-Min/FA/Lycopen/Lutein 1 each PO DAILY 12/02/19 12/04/19 Unknown History





[Centrum Silver Tablet]     











Active Medications: 














Generic Name Dose Route Start Last Admin





  Trade Name Freq  PRN Reason Stop Dose Admin


 


Acetaminophen  650 mg  12/02/19 15:40 





  Tylenol  PO  





  Q4H PRN  





  Pain MILD(1-3)/Fever >100.5/HA  


 


Albuterol  2.5 mg  12/04/19 16:38 





  Proventil  IH  





  Q4HRT PRN  





  Shortness Of Breath  


 


Atorvastatin Calcium  10 mg  12/02/19 22:00  12/04/19 21:52





  Atorvastatin  PO   10 mg





  QHS LINUS   Administration


 


Ceftriaxone Sodium  1 gm in 50 mls @ 100 mls/hr  12/04/19 13:00  12/04/19 13:30





  Rocephin/Ns 1 Gm/50 Ml  IV   Infused





  Q24H LINUS   Infusion


 


Metoprolol Tartrate  12.5 mg  12/02/19 22:00  12/04/19 21:51





  Metoprolol  PO   12.5 mg





  BID LINUS   Administration


 


Ondansetron HCl  4 mg  12/02/19 15:40 





  Zofran  IV  





  Q8H PRN  





  Nausea And Vomiting  


 


Sodium Chloride  10 ml  12/02/19 22:00  12/04/19 21:53





  Sodium Chloride Flush Syringe 10 Ml  IV   10 ml





  BID LINUS   Administration


 


Sodium Chloride  10 ml  12/02/19 15:40  12/02/19 17:48





  Sodium Chloride Flush Syringe 10 Ml  IV   10 ml





  PRN PRN   Administration





  LINE FLUSH

## 2019-12-05 NOTE — XRAY REPORT
CHEST 1 VIEW 



INDICATION:  hypoxia.



COMPARISON:  12/2/2019



FINDINGS:

Support devices: Left arm PICC terminates in the mid SVC. 



Heart: Within normal limits. 

Lungs/Pleura: Mild underlying emphysema is suspected. Pulmonary venous congestion has nearly resolved
 since the previous exam. No evidence for infiltrate, pleural fluid or pneumothorax. 



Additional findings: None.



IMPRESSION:

 No acute cardiopulmonary process.

Emphysematous changes.

Decreased pulmonary venous congestion.



Signer Name: Jah Lang Jr, MD 

Signed: 12/5/2019 3:00 PM

 Workstation Name: [id number]

## 2019-12-05 NOTE — PROGRESS NOTE
Assessment and Plan





73 y/o male with known COPD admitted with worsening shortness of breath, acute 

on chronic respiratory failure most likely secondary to pulmonary edema with low

lung reserve and also pancyotopenia.





No new recommendations for today.  Await Heme eval.





1.  Do not feel in overt COPD exacerbation so no need for systemic steroids.


2.  Smoking cessation counseling again at bedside


3.  Has home O2 already


4.  Not overly concerned about pneumonia given rapid improvement.  Suggest 

checking procalcitonin.  If normal or negative, would stop abx


5.  Has responded well to lasix therapy.


6.  Will add PRN neb therapy.





Subjective


Date of service: 12/05/19


Principal diagnosis: COPD exac; resp failure; anemia; MDS; NSTEMI


Interval history: 





No acute events.   Pulm status stable to improving.





Objective


                               Vital Signs - 12hr











  12/05/19 12/05/19 12/05/19





  04:00 05:00 06:00


 


Temperature 98.1 F  


 


Pulse Rate  72 75


 


Pulse Rate [   





From Monitor]   


 


Respiratory   





Rate   


 


Blood Pressure 109/52 118/48 112/41


 


O2 Sat by Pulse 99 95 93





Oximetry   














  12/05/19 12/05/19 12/05/19





  07:00 07:22 08:00


 


Temperature   


 


Pulse Rate 73  73


 


Pulse Rate [   101 H





From Monitor]   


 


Respiratory   18





Rate   


 


Blood Pressure 121/47  107/47


 


O2 Sat by Pulse 97 97 96





Oximetry   














  12/05/19 12/05/19 12/05/19





  09:00 10:00 10:10


 


Temperature   


 


Pulse Rate 99 H 94 H 93 H


 


Pulse Rate [   





From Monitor]   


 


Respiratory   





Rate   


 


Blood Pressure 107/47 117/49 117/43


 


O2 Sat by Pulse 89 97 





Oximetry   














  12/05/19 12/05/19 12/05/19





  11:00 12:00 13:00


 


Temperature   


 


Pulse Rate 81 116 H 77


 


Pulse Rate [  97 H 





From Monitor]   


 


Respiratory  20 





Rate   


 


Blood Pressure 108/46 108/46 106/46


 


O2 Sat by Pulse 96 89 96





Oximetry   














  12/05/19 12/05/19





  14:00 15:00


 


Temperature  


 


Pulse Rate 86 78


 


Pulse Rate [  





From Monitor]  


 


Respiratory  





Rate  


 


Blood Pressure 106/46 113/44


 


O2 Sat by Pulse 97 99





Oximetry  











Constitutional: no acute distress, alert


ENT: other (poor dentition)


Neck: supple


Ascultation: Bilateral: rales (at the bases)


Percussion: Bilateral: not dull


Tactile fremitus: Bilateral: normal


Cardiovascular: regular rate and rhythm


Gastrointestinal: normoactive bowel sounds


CBC and BMP: 


                                 12/05/19 04:45





                                 12/05/19 04:45


ABG, PT/INR, D-dimer: 


ABG











POC ABG pH  7.072  (7.35-7.45)  L  12/02/19  06:58    


 


POC ABG pCO2  47.1  (35-45)  H  12/02/19  06:58    


 


POC ABG pO2  327  ()  H  12/02/19  06:58    


 


POC ABG HCO3  13.7  (22-26 mml/L)   12/02/19  06:58    


 


POC ABG Total CO2  15  (23-27mmol/L)   12/02/19  06:58    


 


POC ABG O2 Sat  100   12/02/19  06:58    





PT/INR, D-dimer











D-Dimer  891.58 ng/mlDDU (0-234)  H  12/02/19  06:58    








Abnormal lab findings: 


                                  Abnormal Labs











  12/02/19 12/02/19 12/02/19





  06:58 06:58 06:58


 


WBC   


 


RBC  1.99 L  


 


Hgb  6.3 L  


 


Hct  21.1 L  


 


MCV  106 H  


 


MCHC  30 L  


 


RDW  30.0 H  


 


Plt Count  95 L  


 


Seg Neuts % (Manual)   


 


Lymphocytes % (Manual)   


 


Basophils % (Manual)  2.0 H  


 


Nucleated RBC %  7.0 H  


 


Seg Neutrophils # Man  0.0 L  


 


Lymphocytes # (Manual)  0.0 L  


 


D-Dimer   891.58 H 


 


POC ABG pH   


 


POC ABG pCO2   


 


POC ABG pO2   


 


Chloride   


 


Carbon Dioxide    15 L


 


BUN    30 H


 


Creatinine    1.8 H


 


Glucose    259 H


 


Calcium   


 


AST    55 H


 


Troponin T    0.118 H*


 


NT-Pro-B Natriuret Pep   


 


Urine WBC (Auto)   


 


Urine Creatinine   


 


Urine Total Protein   


 


Crossmatch   














  12/02/19 12/02/19 12/02/19





  06:58 07:25 08:40


 


WBC   


 


RBC   


 


Hgb   


 


Hct   


 


MCV   


 


MCHC   


 


RDW   


 


Plt Count   


 


Seg Neuts % (Manual)   


 


Lymphocytes % (Manual)   


 


Basophils % (Manual)   


 


Nucleated RBC %   


 


Seg Neutrophils # Man   


 


Lymphocytes # (Manual)   


 


D-Dimer   


 


POC ABG pH  7.072 L  


 


POC ABG pCO2  47.1 H  


 


POC ABG pO2  327 H  


 


Chloride   


 


Carbon Dioxide   


 


BUN   


 


Creatinine   


 


Glucose   


 


Calcium   


 


AST   


 


Troponin T   


 


NT-Pro-B Natriuret Pep   4210 H 


 


Urine WBC (Auto)   


 


Urine Creatinine   


 


Urine Total Protein   


 


Crossmatch    See Detail














  12/02/19 12/02/19 12/03/19





  14:51 18:26 04:23


 


WBC   


 


RBC   


 


Hgb   


 


Hct   


 


MCV   


 


MCHC   


 


RDW   


 


Plt Count   


 


Seg Neuts % (Manual)   


 


Lymphocytes % (Manual)   


 


Basophils % (Manual)   


 


Nucleated RBC %   


 


Seg Neutrophils # Man   


 


Lymphocytes # (Manual)   


 


D-Dimer   


 


POC ABG pH   


 


POC ABG pCO2   


 


POC ABG pO2   


 


Chloride   


 


Carbon Dioxide   


 


BUN    44 H


 


Creatinine    1.8 H


 


Glucose    154 H


 


Calcium    8.3 L


 


AST   


 


Troponin T  0.579 H* D  1.150 H* D  1.160 H*


 


NT-Pro-B Natriuret Pep   


 


Urine WBC (Auto)   


 


Urine Creatinine   


 


Urine Total Protein   


 


Crossmatch   














  12/03/19 12/03/19 12/03/19





  07:01 19:55 19:55


 


WBC  2.3 L  


 


RBC  2.43 L  


 


Hgb  7.6 L  


 


Hct  22.9 L  


 


MCV  95 H  


 


MCHC   


 


RDW  23.8 H  


 


Plt Count  41 L  


 


Seg Neuts % (Manual)  71.0 H  


 


Lymphocytes % (Manual)   


 


Basophils % (Manual)   


 


Nucleated RBC %  1.0 H  


 


Seg Neutrophils # Man  1.6 L  


 


Lymphocytes # (Manual)  0.4 L  


 


D-Dimer   


 


POC ABG pH   


 


POC ABG pCO2   


 


POC ABG pO2   


 


Chloride   


 


Carbon Dioxide   


 


BUN   


 


Creatinine   


 


Glucose   


 


Calcium   


 


AST   


 


Troponin T   


 


NT-Pro-B Natriuret Pep   


 


Urine WBC (Auto)   21.0 H 


 


Urine Creatinine    75.4 H


 


Urine Total Protein    25 H


 


Crossmatch   














  12/04/19 12/04/19 12/05/19





  03:42 03:42 04:45


 


WBC  2.6 L   2.0 L


 


RBC  2.46 L   2.24 L


 


Hgb  7.8 L   7.0 L


 


Hct  23.6 L   21.4 L


 


MCV  96 H   96 H


 


MCHC   


 


RDW  24.0 H   22.8 H


 


Plt Count  52 L   48 L


 


Seg Neuts % (Manual)   


 


Lymphocytes % (Manual)    38.0 H


 


Basophils % (Manual)   


 


Nucleated RBC %    1.0 H


 


Seg Neutrophils # Man    1.2 L


 


Lymphocytes # (Manual)    0.8 L


 


D-Dimer   


 


POC ABG pH   


 


POC ABG pCO2   


 


POC ABG pO2   


 


Chloride   107.9 H 


 


Carbon Dioxide   


 


BUN   47 H 


 


Creatinine   1.6 H 


 


Glucose   130 H 


 


Calcium   


 


AST   


 


Troponin T   


 


NT-Pro-B Natriuret Pep   


 


Urine WBC (Auto)   


 


Urine Creatinine   


 


Urine Total Protein   


 


Crossmatch   














  12/05/19





  04:45


 


WBC 


 


RBC 


 


Hgb 


 


Hct 


 


MCV 


 


MCHC 


 


RDW 


 


Plt Count 


 


Seg Neuts % (Manual) 


 


Lymphocytes % (Manual) 


 


Basophils % (Manual) 


 


Nucleated RBC % 


 


Seg Neutrophils # Man 


 


Lymphocytes # (Manual) 


 


D-Dimer 


 


POC ABG pH 


 


POC ABG pCO2 


 


POC ABG pO2 


 


Chloride 


 


Carbon Dioxide 


 


BUN  34 H


 


Creatinine 


 


Glucose  130 H


 


Calcium  8.2 L


 


AST 


 


Troponin T 


 


NT-Pro-B Natriuret Pep 


 


Urine WBC (Auto) 


 


Urine Creatinine 


 


Urine Total Protein 


 


Crossmatch

## 2019-12-05 NOTE — PROGRESS NOTE
Assessment and Plan


Assessment and plan: 





Acute hypoxic resp failure.  Etiology likely sec heart failure.  CXR with 

bilateral interstitial opacities and elevated BNP.  Check Echo





Elevated trop.  Cards consulted.  Check ECHO





MDS.  Pt with recent dx of MDS and is followed by Devin Wolfe.  Heme consult





ARf.  Nephrology consulted





Anemia.  Tansfusee PRBCs as needed





History


Interval history: 





No new issues overnight





Hospitalist Physical





- Constitutional


Vitals: 


                                        











Temp Pulse Resp BP Pulse Ox


 


 98.1 F   78   20   113/44   99 


 


 12/05/19 04:00  12/05/19 15:00  12/05/19 12:00  12/05/19 15:00  12/05/19 15:00











General appearance: Present: mild distress (sob)





- EENT


Eyes: Present: PERRL, EOM intact


ENT: hearing intact, clear oral mucosa, dentition normal





- Neck


Neck: Present: supple, normal ROM





- Respiratory


Respiratory effort: normal


Respiratory: bilateral: CTA





- Cardiovascular


Rhythm: regular


Heart Sounds: Present: S1 & S2.  Absent: gallop, rub





- Extremities


Extremities: no ischemia, No edema, Full ROM





- Abdominal


General gastrointestinal: soft, non-tender, non-distended, normal bowel sounds





- Integumentary


Integumentary: Present: clear, warm, dry





- Neurologic


Neurologic: CNII-XII intact, moves all extremities





Results





- Labs


CBC & Chem 7: 


                                 12/05/19 04:45





                                 12/05/19 04:45


Labs: 


                             Laboratory Last Values











WBC  2.0 K/mm3 (4.5-11.0)  L  12/05/19  04:45    


 


RBC  2.24 M/mm3 (3.65-5.03)  L  12/05/19  04:45    


 


Hgb  7.0 gm/dl (11.8-15.2)  L  12/05/19  04:45    


 


Hct  21.4 % (35.5-45.6)  L  12/05/19  04:45    


 


MCV  96 fl (84-94)  H  12/05/19  04:45    


 


MCH  31 pg (28-32)   12/05/19  04:45    


 


MCHC  33 % (32-34)   12/05/19  04:45    


 


RDW  22.8 % (13.2-15.2)  H  12/05/19  04:45    


 


Plt Count  48 K/mm3 (140-440)  L  12/05/19  04:45    


 


Add Manual Diff  Complete   12/05/19  04:45    


 


Total Counted  100   12/05/19  04:45    


 


Seg Neuts % (Manual)  59.0 % (40.0-70.0)   12/05/19  04:45    


 


Band Neutrophils %  0 %  12/05/19  04:45    


 


Lymphocytes % (Manual)  38.0 % (13.4-35.0)  H  12/05/19  04:45    


 


Reactive Lymphs % (Man)  0 %  12/05/19  04:45    


 


Monocytes % (Manual)  3.0 % (0.0-7.3)   12/05/19  04:45    


 


Eosinophils % (Manual)  0 % (0.0-4.3)   12/05/19  04:45    


 


Basophils % (Manual)  0 % (0.0-1.8)   12/05/19  04:45    


 


Metamyelocytes %  0 %  12/05/19  04:45    


 


Myelocytes %  0 %  12/05/19  04:45    


 


Promyelocytes %  0 %  12/05/19  04:45    


 


Blast Cells %  0 %  12/05/19  04:45    


 


Nucleated RBC %  1.0 % (0.0-0.9)  H  12/05/19  04:45    


 


Seg Neutrophils # Man  1.2 K/mm3 (1.8-7.7)  L  12/05/19  04:45    


 


Band Neutrophils #  0.0 K/mm3  12/05/19  04:45    


 


Lymphocytes # (Manual)  0.8 K/mm3 (1.2-5.4)  L  12/05/19  04:45    


 


Abs React Lymphs (Man)  0.0 K/mm3  12/05/19  04:45    


 


Monocytes # (Manual)  0.1 K/mm3 (0.0-0.8)   12/05/19  04:45    


 


Eosinophils # (Manual)  0.0 K/mm3 (0.0-0.4)   12/05/19  04:45    


 


Basophils # (Manual)  0.0 K/mm3 (0.0-0.1)   12/05/19  04:45    


 


Metamyelocytes #  0.0 K/mm3  12/05/19  04:45    


 


Myelocytes #  0.0 K/mm3  12/05/19  04:45    


 


Promyelocytes #  0.0 K/mm3  12/05/19  04:45    


 


Blast Cells #  0.0 K/mm3  12/05/19  04:45    


 


WBC Morphology  Not Reportable   12/05/19  04:45    


 


Hypersegmented Neuts  Not Reportable   12/05/19  04:45    


 


Hyposegmented Neuts  Not Reportable   12/05/19  04:45    


 


Hypogranular Neuts  Not Reportable   12/05/19  04:45    


 


Smudge Cells  Not Reportable   12/05/19  04:45    


 


Toxic Granulation  Not Reportable   12/05/19  04:45    


 


Toxic Vacuolation  Not Reportable   12/05/19  04:45    


 


Dohle Bodies  Not Reportable   12/05/19  04:45    


 


Pelger-Huet Anomaly  Not Reportable   12/05/19  04:45    


 


Gasper Rods  Not Reportable   12/05/19  04:45    


 


Platelet Estimate  Consistent w auto   12/05/19  04:45    


 


Clumped Platelets  Not Reportable   12/05/19  04:45    


 


Plt Clumps, EDTA  Not Reportable   12/05/19  04:45    


 


Large Platelets  Not Reportable   12/05/19  04:45    


 


Giant Platelets  Not Reportable   12/05/19  04:45    


 


Platelet Satelliting  Not Reportable   12/05/19  04:45    


 


Plt Morphology Comment  Not Reportable   12/05/19  04:45    


 


RBC Morphology  Not Reportable   12/05/19  04:45    


 


Dimorphic RBCs  Not Reportable   12/05/19  04:45    


 


Polychromasia  Not Reportable   12/05/19  04:45    


 


Hypochromasia  Not Reportable   12/05/19  04:45    


 


Poikilocytosis  Not Reportable   12/05/19  04:45    


 


Anisocytosis  1+   12/05/19  04:45    


 


Microcytosis  Not Reportable   12/05/19  04:45    


 


Macrocytosis  Not Reportable   12/05/19  04:45    


 


Spherocytes  Not Reportable   12/05/19  04:45    


 


Pappenheimer Bodies  Not Reportable   12/05/19  04:45    


 


Sickle Cells  Not Reportable   12/05/19  04:45    


 


Target Cells  Not Reportable   12/05/19  04:45    


 


Tear Drop Cells  Not Reportable   12/05/19  04:45    


 


Ovalocytes  Not Reportable   12/05/19  04:45    


 


Helmet Cells  Not Reportable   12/05/19  04:45    


 


[place] Bodies  Not Reportable   12/05/19  04:45    


 


Cabot Rings  Not Reportable   12/05/19  04:45    


 


[place] Cells  Not Reportable   12/05/19  04:45    


 


Bite Cells  Not Reportable   12/05/19  04:45    


 


Crenated Cell  Not Reportable   12/05/19  04:45    


 


Elliptocytes  Not Reportable   12/05/19  04:45    


 


Acanthocytes (Spur)  Not Reportable   12/05/19  04:45    


 


Rouleaux  Not Reportable   12/05/19  04:45    


 


Hemoglobin C Crystals  Not Reportable   12/05/19  04:45    


 


Schistocytes  Not Reportable   12/05/19  04:45    


 


Malaria parasites  Not Reportable   12/05/19  04:45    


 


[place] Bodies  Not Reportable   12/05/19  04:45    


 


Hem Pathologist Commnt  No   12/05/19  04:45    


 


D-Dimer  891.58 ng/mlDDU (0-234)  H  12/02/19  06:58    


 


POC ABG pH  7.072  (7.35-7.45)  L  12/02/19  06:58    


 


POC ABG pCO2  47.1  (35-45)  H  12/02/19  06:58    


 


POC ABG pO2  327  ()  H  12/02/19  06:58    


 


POC ABG HCO3  13.7  (22-26 mml/L)   12/02/19  06:58    


 


POC ABG Total CO2  15  (23-27mmol/L)   12/02/19  06:58    


 


POC ABG O2 Sat  100   12/02/19  06:58    


 


POC ABG Base Excess  -16  ((-2) - (+3)mmol/L)   12/02/19  06:58    


 


FiO2  100 %  12/02/19  06:58    


 


Sodium  143 mmol/L (137-145)   12/05/19  04:45    


 


Potassium  3.7 mmol/L (3.6-5.0)   12/05/19  04:45    


 


Chloride  105.0 mmol/L ()   12/05/19  04:45    


 


Carbon Dioxide  27 mmol/L (22-30)   12/05/19  04:45    


 


Anion Gap  15 mmol/L  12/05/19  04:45    


 


BUN  34 mg/dL (9-20)  H  12/05/19  04:45    


 


Creatinine  1.5 mg/dL (0.8-1.5)   12/05/19  04:45    


 


Estimated GFR  46 ml/min  12/05/19  04:45    


 


BUN/Creatinine Ratio  23 %  12/05/19  04:45    


 


Glucose  130 mg/dL ()  H  12/05/19  04:45    


 


Calcium  8.2 mg/dL (8.4-10.2)  L  12/05/19  04:45    


 


Total Bilirubin  0.90 mg/dL (0.1-1.2)   12/02/19  06:58    


 


AST  55 units/L (5-40)  H  12/02/19  06:58    


 


ALT  36 units/L (7-56)   12/02/19  06:58    


 


Alkaline Phosphatase  62 units/L ()   12/02/19  06:58    


 


Ammonia  30.0 umol/L (25-60)   12/02/19  06:58    


 


Troponin T  1.160 ng/mL (0.00-0.029)  H*  12/03/19  04:23    


 


NT-Pro-B Natriuret Pep  4210 pg/mL (0-900)  H  12/02/19  07:25    


 


Total Protein  7.0 g/dL (6.3-8.2)   12/02/19  06:58    


 


Albumin  4.2 g/dL (3.9-5)   12/02/19  06:58    


 


Albumin/Globulin Ratio  1.5 %  12/02/19  06:58    


 


Triglycerides  46 mg/dL (2-149)   12/02/19  06:58    


 


Cholesterol  114 mg/dL ()   12/02/19  06:58    


 


LDL Cholesterol Direct  68 mg/dL ()   12/02/19  06:58    


 


HDL Cholesterol  51 mg/dL (40-59)   12/02/19  06:58    


 


Cholesterol/HDL Ratio  2.23 %  12/02/19  06:58    


 


TSH  1.590 mlU/mL (0.270-4.200)   12/02/19  06:58    


 


Urine Color  Yellow  (Yellow)   12/03/19  19:55    


 


Urine Turbidity  Clear  (Clear)   12/03/19  19:55    


 


Urine pH  5.0  (5.0-7.0)   12/03/19  19:55    


 


Ur Specific Gravity  1.014  (1.003-1.030)   12/03/19  19:55    


 


Urine Protein  30 mg/dl mg/dL (Negative)   12/03/19  19:55    


 


Urine Glucose (UA)  Neg mg/dL (Negative)   12/03/19  19:55    


 


Urine Ketones  Neg mg/dL (Negative)   12/03/19  19:55    


 


Urine Blood  Sm  (Negative)   12/03/19  19:55    


 


Urine Nitrite  Neg  (Negative)   12/03/19  19:55    


 


Urine Bilirubin  Neg  (Negative)   12/03/19  19:55    


 


Urine Urobilinogen  < 2.0 mg/dL (<2.0)   12/03/19  19:55    


 


Ur Leukocyte Esterase  Sm  (Negative)   12/03/19  19:55    


 


Urine WBC (Auto)  21.0 /HPF (0.0-6.0)  H  12/03/19  19:55    


 


Urine RBC (Auto)  4.0 /HPF (0.0-6.0)   12/03/19  19:55    


 


U Epithel Cells (Auto)  < 1.0 /HPF (0-13.0)   12/03/19  19:55    


 


Urine Mucus  Few /HPF  12/03/19  19:55    


 


Urine Creatinine  75.4 mg/dL (0.1-20.0)  H  12/03/19  19:55    


 


Protein/Creatinin Ratio  0.33   12/03/19  19:55    


 


Urine Sodium  45 mmol/L  12/03/19  19:55    


 


Urine Total Protein  25 mg/dL (5-11.8)  H  12/03/19  19:55    


 


Blood Type  O POSITIVE   12/02/19  08:40    


 


Antibody Screen  Negative   12/02/19  08:40    


 


Crossmatch  See Detail   12/02/19  08:40    














Active Medications





- Current Medications


Current Medications: 














Generic Name Dose Route Start Last Admin





  Trade Name Freq  PRN Reason Stop Dose Admin


 


Acetaminophen  650 mg  12/02/19 15:40 





  Tylenol  PO  





  Q4H PRN  





  Pain MILD(1-3)/Fever >100.5/HA  


 


Albuterol  2.5 mg  12/04/19 16:38 





  Proventil  IH  





  Q4HRT PRN  





  Shortness Of Breath  


 


Atorvastatin Calcium  10 mg  12/02/19 22:00  12/04/19 21:52





  Atorvastatin  PO   10 mg





  QHS LINUS   Administration


 


Metoprolol Tartrate  12.5 mg  12/02/19 22:00  12/05/19 10:10





  Metoprolol  PO   12.5 mg





  BID LINUS   Administration


 


Ondansetron HCl  4 mg  12/02/19 15:40 





  Zofran  IV  





  Q8H PRN  





  Nausea And Vomiting  


 


Sodium Chloride  10 ml  12/02/19 22:00  12/05/19 10:11





  Sodium Chloride Flush Syringe 10 Ml  IV   10 ml





  BID LINUS   Administration


 


Sodium Chloride  10 ml  12/02/19 15:40  12/02/19 17:48





  Sodium Chloride Flush Syringe 10 Ml  IV   10 ml





  PRN PRN   Administration





  LINE FLUSH

## 2019-12-06 NOTE — PROGRESS NOTE
Assessment and Plan


Assessment and plan: 





Acute hypoxic resp failure.  Etiology likely sec heart failure.  CXR with 

bilateral interstitial opacities and elevated BNP.  





paroxysmal atrial fibrillation with RVR.  New onset.  Continue cardizem for rate

control.   No systemic AC at this time in setting of significant anemia and 

thrombocytopenia. 





Elevated trop.  Cards consulted.  Check ECHO





MDS.  Await Heme consult





ARF.  Nephrology consulted





Anemia.  Transfuse PRBCs as needed





History


Interval history: 





No new issues overnight





Hospitalist Physical





- Constitutional


Vitals: 


                                        











Temp Pulse Resp BP Pulse Ox


 


 98.2 F   61   18   148/84   100 


 


 12/06/19 12:41  12/06/19 12:41  12/06/19 12:41  12/06/19 12:41  12/06/19 12:41











General appearance: Present: no acute distress





- EENT


Eyes: Present: PERRL, EOM intact


ENT: hearing intact, clear oral mucosa, dentition normal





- Neck


Neck: Present: supple, normal ROM





- Respiratory


Respiratory effort: normal


Respiratory: bilateral: CTA





- Cardiovascular


Rhythm: regular


Heart Sounds: Present: S1 & S2.  Absent: gallop, rub





- Extremities


Extremities: no ischemia, No edema, Full ROM





- Abdominal


General gastrointestinal: soft, non-tender, non-distended, normal bowel sounds





- Integumentary


Integumentary: Present: clear, warm, dry





- Neurologic


Neurologic: CNII-XII intact, moves all extremities





Results





- Labs


CBC & Chem 7: 


                                 12/06/19 07:21





                                 12/06/19 07:21


Labs: 


                             Laboratory Last Values











WBC  1.6 K/mm3 (4.5-11.0)  L*  12/06/19  07:21    


 


RBC  2.28 M/mm3 (3.65-5.03)  L  12/06/19  07:21    


 


Hgb  7.3 gm/dl (11.8-15.2)  L  12/06/19  07:21    


 


Hct  21.3 % (35.5-45.6)  L  12/06/19  07:21    


 


MCV  94 fl (84-94)   12/06/19  07:21    


 


MCH  32 pg (28-32)   12/06/19  07:21    


 


MCHC  34 % (32-34)   12/06/19  07:21    


 


RDW  22.5 % (13.2-15.2)  H  12/06/19  07:21    


 


Plt Count  39 K/mm3 (140-440)  L  12/06/19  07:21    


 


Add Manual Diff  Complete   12/06/19  07:21    


 


Total Counted  100   12/06/19  07:21    


 


Seg Neuts % (Manual)  47.0 % (40.0-70.0)   12/06/19  07:21    


 


Band Neutrophils %  3.0 %  12/06/19  07:21    


 


Lymphocytes % (Manual)  46.0 % (13.4-35.0)  H  12/06/19  07:21    


 


Reactive Lymphs % (Man)  0 %  12/06/19  07:21    


 


Monocytes % (Manual)  4.0 % (0.0-7.3)   12/06/19  07:21    


 


Eosinophils % (Manual)  0 % (0.0-4.3)   12/06/19  07:21    


 


Basophils % (Manual)  0 % (0.0-1.8)   12/06/19  07:21    


 


Metamyelocytes %  0 %  12/06/19  07:21    


 


Myelocytes %  0 %  12/06/19  07:21    


 


Promyelocytes %  0 %  12/06/19  07:21    


 


Blast Cells %  0 %  12/06/19  07:21    


 


Nucleated RBC %  1.0 % (0.0-0.9)  H  12/06/19  07:21    


 


Seg Neutrophils # Man  0.8 K/mm3 (1.8-7.7)  L  12/06/19  07:21    


 


Band Neutrophils #  0.0 K/mm3  12/06/19  07:21    


 


Lymphocytes # (Manual)  0.7 K/mm3 (1.2-5.4)  L  12/06/19  07:21    


 


Abs React Lymphs (Man)  0.0 K/mm3  12/06/19  07:21    


 


Monocytes # (Manual)  0.1 K/mm3 (0.0-0.8)   12/06/19  07:21    


 


Eosinophils # (Manual)  0.0 K/mm3 (0.0-0.4)   12/06/19  07:21    


 


Basophils # (Manual)  0.0 K/mm3 (0.0-0.1)   12/06/19  07:21    


 


Metamyelocytes #  0.0 K/mm3  12/06/19  07:21    


 


Myelocytes #  0.0 K/mm3  12/06/19  07:21    


 


Promyelocytes #  0.0 K/mm3  12/06/19  07:21    


 


Blast Cells #  0.0 K/mm3  12/06/19  07:21    


 


WBC Morphology  Not Reportable   12/06/19  07:21    


 


Hypersegmented Neuts  Not Reportable   12/06/19  07:21    


 


Hyposegmented Neuts  Not Reportable   12/06/19  07:21    


 


Hypogranular Neuts  Not Reportable   12/06/19  07:21    


 


Smudge Cells  Not Reportable   12/06/19  07:21    


 


Toxic Granulation  Not Reportable   12/06/19  07:21    


 


Toxic Vacuolation  Not Reportable   12/06/19  07:21    


 


Dohle Bodies  Not Reportable   12/06/19  07:21    


 


Pelger-Huet Anomaly  Not Reportable   12/06/19  07:21    


 


Gasper Rods  Not Reportable   12/06/19  07:21    


 


Platelet Estimate  Consistent w auto   12/06/19  07:21    


 


Clumped Platelets  Not Reportable   12/06/19  07:21    


 


Plt Clumps, EDTA  Not Reportable   12/06/19  07:21    


 


Large Platelets  Not Reportable   12/06/19  07:21    


 


Giant Platelets  Not Reportable   12/06/19  07:21    


 


Platelet Satelliting  Not Reportable   12/06/19  07:21    


 


Plt Morphology Comment  Not Reportable   12/06/19  07:21    


 


RBC Morphology  Not Reportable   12/06/19  07:21    


 


Dimorphic RBCs  Not Reportable   12/06/19  07:21    


 


Polychromasia  Not Reportable   12/06/19  07:21    


 


Hypochromasia  Few   12/06/19  07:21    


 


Poikilocytosis  Not Reportable   12/06/19  07:21    


 


Anisocytosis  1+   12/06/19  07:21    


 


Microcytosis  Not Reportable   12/06/19  07:21    


 


Macrocytosis  Not Reportable   12/06/19  07:21    


 


Spherocytes  Not Reportable   12/06/19  07:21    


 


Pappenheimer Bodies  Not Reportable   12/06/19  07:21    


 


Sickle Cells  Not Reportable   12/06/19  07:21    


 


Target Cells  Not Reportable   12/06/19  07:21    


 


Tear Drop Cells  Few   12/06/19  07:21    


 


Ovalocytes  Not Reportable   12/06/19  07:21    


 


Helmet Cells  Not Reportable   12/06/19  07:21    


 


[place] [employer] Bodies  Not Reportable   12/06/19  07:21    


 


Cabot Rings  Not Reportable   12/06/19  07:21    


 


[place] Cells  Not Reportable   12/06/19  07:21    


 


Bite Cells  Not Reportable   12/06/19  07:21    


 


Crenated Cell  Not Reportable   12/06/19  07:21    


 


Elliptocytes  Few   12/06/19  07:21    


 


Acanthocytes (Spur)  Not Reportable   12/06/19  07:21    


 


Rouleaux  Not Reportable   12/06/19  07:21    


 


Hemoglobin C Crystals  Not Reportable   12/06/19  07:21    


 


Schistocytes  Not Reportable   12/06/19  07:21    


 


Malaria parasites  Not Reportable   12/06/19  07:21    


 


[place] Bodies  Not Reportable   12/06/19  07:21    


 


Hem Pathologist Commnt  No   12/06/19  07:21    


 


D-Dimer  891.58 ng/mlDDU (0-234)  H  12/02/19  06:58    


 


POC ABG pH  7.072  (7.35-7.45)  L  12/02/19  06:58    


 


POC ABG pCO2  47.1  (35-45)  H  12/02/19  06:58    


 


POC ABG pO2  327  ()  H  12/02/19  06:58    


 


POC ABG HCO3  13.7  (22-26 mml/L)   12/02/19  06:58    


 


POC ABG Total CO2  15  (23-27mmol/L)   12/02/19  06:58    


 


POC ABG O2 Sat  100   12/02/19  06:58    


 


POC ABG Base Excess  -16  ((-2) - (+3)mmol/L)   12/02/19  06:58    


 


FiO2  100 %  12/02/19  06:58    


 


Sodium  141 mmol/L (137-145)   12/06/19  07:21    


 


Potassium  3.7 mmol/L (3.6-5.0)   12/06/19  07:21    


 


Chloride  102.4 mmol/L ()   12/06/19  07:21    


 


Carbon Dioxide  26 mmol/L (22-30)   12/06/19  07:21    


 


Anion Gap  16 mmol/L  12/06/19  07:21    


 


BUN  27 mg/dL (9-20)  H  12/06/19  07:21    


 


Creatinine  1.3 mg/dL (0.8-1.5)   12/06/19  07:21    


 


Estimated GFR  54 ml/min  12/06/19  07:21    


 


BUN/Creatinine Ratio  21 %  12/06/19  07:21    


 


Glucose  113 mg/dL ()  H  12/06/19  07:21    


 


Calcium  8.5 mg/dL (8.4-10.2)   12/06/19  07:21    


 


Total Bilirubin  0.90 mg/dL (0.1-1.2)   12/02/19  06:58    


 


AST  55 units/L (5-40)  H  12/02/19  06:58    


 


ALT  36 units/L (7-56)   12/02/19  06:58    


 


Alkaline Phosphatase  62 units/L ()   12/02/19  06:58    


 


Ammonia  30.0 umol/L (25-60)   12/02/19  06:58    


 


Troponin T  0.767 ng/mL (0.00-0.029)  H* D 12/06/19  07:21    


 


NT-Pro-B Natriuret Pep  4210 pg/mL (0-900)  H  12/02/19  07:25    


 


Total Protein  7.0 g/dL (6.3-8.2)   12/02/19  06:58    


 


Albumin  4.2 g/dL (3.9-5)   12/02/19  06:58    


 


Albumin/Globulin Ratio  1.5 %  12/02/19  06:58    


 


Triglycerides  46 mg/dL (2-149)   12/02/19  06:58    


 


Cholesterol  114 mg/dL ()   12/02/19  06:58    


 


LDL Cholesterol Direct  68 mg/dL ()   12/02/19  06:58    


 


HDL Cholesterol  51 mg/dL (40-59)   12/02/19  06:58    


 


Cholesterol/HDL Ratio  2.23 %  12/02/19  06:58    


 


TSH  1.590 mlU/mL (0.270-4.200)   12/02/19  06:58    


 


Urine Color  Yellow  (Yellow)   12/03/19  19:55    


 


Urine Turbidity  Clear  (Clear)   12/03/19  19:55    


 


Urine pH  5.0  (5.0-7.0)   12/03/19  19:55    


 


Ur Specific Gravity  1.014  (1.003-1.030)   12/03/19  19:55    


 


Urine Protein  30 mg/dl mg/dL (Negative)   12/03/19  19:55    


 


Urine Glucose (UA)  Neg mg/dL (Negative)   12/03/19  19:55    


 


Urine Ketones  Neg mg/dL (Negative)   12/03/19  19:55    


 


Urine Blood  Sm  (Negative)   12/03/19  19:55    


 


Urine Nitrite  Neg  (Negative)   12/03/19  19:55    


 


Urine Bilirubin  Neg  (Negative)   12/03/19  19:55    


 


Urine Urobilinogen  < 2.0 mg/dL (<2.0)   12/03/19  19:55    


 


Ur Leukocyte Esterase  Sm  (Negative)   12/03/19  19:55    


 


Urine WBC (Auto)  21.0 /HPF (0.0-6.0)  H  12/03/19  19:55    


 


Urine RBC (Auto)  4.0 /HPF (0.0-6.0)   12/03/19  19:55    


 


U Epithel Cells (Auto)  < 1.0 /HPF (0-13.0)   12/03/19  19:55    


 


Urine Mucus  Few /HPF  12/03/19  19:55    


 


Urine Creatinine  75.4 mg/dL (0.1-20.0)  H  12/03/19  19:55    


 


Protein/Creatinin Ratio  0.33   12/03/19  19:55    


 


Urine Sodium  45 mmol/L  12/03/19  19:55    


 


Urine Total Protein  25 mg/dL (5-11.8)  H  12/03/19  19:55    


 


Blood Type  O POSITIVE   12/02/19  08:40    


 


Antibody Screen  Negative   12/02/19  08:40    


 


Crossmatch  See Detail   12/02/19  08:40    














Active Medications





- Current Medications


Current Medications: 














Generic Name Dose Route Start Last Admin





  Trade Name Freq  PRN Reason Stop Dose Admin


 


Acetaminophen  650 mg  12/02/19 15:40 





  Tylenol  PO  





  Q4H PRN  





  Pain MILD(1-3)/Fever >100.5/HA  


 


Albuterol  2.5 mg  12/04/19 16:38 





  Proventil  IH  





  Q4HRT PRN  





  Shortness Of Breath  


 


Atorvastatin Calcium  10 mg  12/02/19 22:00  12/05/19 21:33





  Atorvastatin  PO   10 mg





  [facility] LINUS   Administration


 


Diltiazem HCl  60 mg  12/06/19 22:00 





  Cardizem  PO  





  BID LINUS  


 


Ondansetron HCl  4 mg  12/02/19 15:40 





  Zofran  IV  





  Q8H PRN  





  Nausea And Vomiting  


 


Sodium Chloride  10 ml  12/02/19 22:00  12/06/19 09:08





  Sodium Chloride Flush Syringe 10 Ml  IV   10 ml





  BID LINUS   Administration


 


Sodium Chloride  10 ml  12/02/19 15:40  12/02/19 17:48





  Sodium Chloride Flush Syringe 10 Ml  IV   10 ml





  PRN PRN   Administration





  LINE FLUSH

## 2019-12-06 NOTE — PROGRESS NOTE
Assessment and Plan


Pt was noted to develop paroxysmal atrial fibrillation with RVR this AM which is

new onset. On evaluation, he has spontaneously converted back to NSR. Optimize 

HR - initiate PO cardizem. No systemic AC at this time in setting of significant

anemia and thrombocytopenia. 


Can consider ischemic evaluation once medically stabilized as OP.


Await hematology consultation.





The patient has been seen in conjunction with Dr. ALONDRA Owens who agrees with the 

assessment and plan of care.  








- Patient Problems


(1) Paroxysmal atrial fibrillation with RVR


Current Visit: Yes   Status: Acute   





(2) Acute and chronic respiratory failure


Current Visit: Yes   Status: Acute   





(3) COPD (chronic obstructive pulmonary disease)


Current Visit: Yes   Status: Chronic   





(4) Symptomatic anemia


Current Visit: Yes   Status: Acute   





(5) Altered mental status


Current Visit: Yes   Status: Resolved   





(6) Thrombocytopenia


Current Visit: Yes   Status: Acute   





(7) NSTEMI (non-ST elevated myocardial infarction)


Current Visit: Yes   Status: Acute   


Plan to address problem: 


type II








(8) Abnormal ECG


Current Visit: Yes   Status: Acute   





(9) PRAKASH (acute kidney injury)


Current Visit: Yes   Status: Acute   





(10) Diabetes


Current Visit: Yes   Status: Chronic   





(11) Myelodysplastic syndrome


Current Visit: Yes   Status: Chronic   





(12) History of colon cancer


Current Visit: Yes   Status: Chronic   





(13) Tobacco use


Current Visit: Yes   Status: Chronic   





Subjective


Date of service: 12/06/19


Principal diagnosis: COPD exac; resp failure; anemia; MDS; NSTEMI


Interval history: 


pt resting in bed, he was noted to develop paroxysmal atrial fibrillation with 

RVR this AM, he c/o palpitations.








Objective





                                Last Vital Signs











Temp  98.2 F   12/06/19 12:41


 


Pulse  61   12/06/19 12:41


 


Resp  18   12/06/19 12:41


 


BP  148/84   12/06/19 12:41


 


Pulse Ox  100   12/06/19 12:41

















- Physical Examination


General: No Apparent Distress


HEENT: Positive: PERRL, Normocephaly, Mucus Membranes Moist


Neck: Positive: neck supple, trachea midline


Cardiac: Positive: Reg Rate and Rhythm, S1/S2


Lungs: Positive: Decreased Breath Sounds


Neuro: Positive: Grossly Intact


Abdomen: Negative: Tender


Skin: Negative: Rash


Musculoskeletal: No Pain


Extremities: Present: edema (trace BLE)





- Labs and Meds


                                       CBC











  12/06/19 Range/Units





  07:21 


 


WBC  1.6 L*  (4.5-11.0)  K/mm3


 


RBC  2.28 L  (3.65-5.03)  M/mm3


 


Hgb  7.3 L  (11.8-15.2)  gm/dl


 


Hct  21.3 L  (35.5-45.6)  %


 


Plt Count  39 L  (140-440)  K/mm3








                          Comprehensive Metabolic Panel











  12/06/19 Range/Units





  07:21 


 


Sodium  141  (137-145)  mmol/L


 


Potassium  3.7  (3.6-5.0)  mmol/L


 


Chloride  102.4  ()  mmol/L


 


Carbon Dioxide  26  (22-30)  mmol/L


 


BUN  27 H  (9-20)  mg/dL


 


Creatinine  1.3  (0.8-1.5)  mg/dL


 


Glucose  113 H  ()  mg/dL


 


Calcium  8.5  (8.4-10.2)  mg/dL














- Imaging and Cardiology


EKG: report reviewed, image reviewed


Echo: report reviewed (11/2019: EF 50-55%, pseudonormalization, mild AR, mild 

AS, mod MR, mod pulm HTN. 5/2011 showed EF 50-55%, mild LVH, mild MR, mild AR, 

trivial TR. )





- EKG


Sinus rhythms and dysrhythmias: sinus rhythm


Repolarization changes or abnormalities: ST or T wave suggestive of ischemia

## 2019-12-06 NOTE — PROGRESS NOTE
Assessment and Plan





73 y/o male with known COPD admitted with worsening shortness of breath, acute 

on chronic respiratory failure most likely secondary to pulmonary edema with low

lung reserve and also pancyotopenia.





No new recommendations for today.  Await Heme eval. Weaned to room air.  Will 

sign off.  Call if questions.





1.  Do not feel in overt COPD exacerbation so no need for systemic steroids.


2.  Smoking cessation counseling again at bedside


3.  Has home O2 already


4.  Not overly concerned about pneumonia given rapid improvement.  Suggest 

checking procalcitonin.  If normal or negative, would stop abx


5.  Has responded well to lasix therapy.


6.  Will add PRN neb therapy.





Subjective


Date of service: 12/06/19


Principal diagnosis: COPD exac; resp failure; anemia; MDS; NSTEMI


Interval history: 





Transitioned out of IMCU.  STable pulm status.  Weaned to room air.





Objective


                               Vital Signs - 12hr











  12/06/19 12/06/19 12/06/19





  04:12 09:04 10:00


 


Temperature 99.1 F 98.2 F 


 


Pulse Rate  168 H 61


 


Respiratory 20 18 18





Rate   


 


Blood Pressure 114/47 126/92 


 


O2 Sat by Pulse  98 





Oximetry   














  12/06/19 12/06/19





  12:34 12:41


 


Temperature 98.3 F 98.2 F


 


Pulse Rate 90 61


 


Respiratory 18 18





Rate  


 


Blood Pressure 114/48 148/84


 


O2 Sat by Pulse 98 100





Oximetry  











Constitutional: no acute distress, alert


ENT: other (poor dentition)


Neck: supple


Ascultation: Bilateral: rales (at the bases)


Percussion: Bilateral: not dull


Tactile fremitus: Bilateral: normal


Cardiovascular: regular rate and rhythm


Gastrointestinal: normoactive bowel sounds


CBC and BMP: 


                                 12/06/19 07:21





                                 12/06/19 07:21


ABG, PT/INR, D-dimer: 


ABG











POC ABG pH  7.072  (7.35-7.45)  L  12/02/19  06:58    


 


POC ABG pCO2  47.1  (35-45)  H  12/02/19  06:58    


 


POC ABG pO2  327  ()  H  12/02/19  06:58    


 


POC ABG HCO3  13.7  (22-26 mml/L)   12/02/19  06:58    


 


POC ABG Total CO2  15  (23-27mmol/L)   12/02/19  06:58    


 


POC ABG O2 Sat  100   12/02/19  06:58    





PT/INR, D-dimer











D-Dimer  891.58 ng/mlDDU (0-234)  H  12/02/19  06:58    








Abnormal lab findings: 


                                  Abnormal Labs











  12/02/19 12/02/19 12/02/19





  06:58 06:58 06:58


 


WBC   


 


RBC  1.99 L  


 


Hgb  6.3 L  


 


Hct  21.1 L  


 


MCV  106 H  


 


MCHC  30 L  


 


RDW  30.0 H  


 


Plt Count  95 L  


 


Seg Neuts % (Manual)   


 


Lymphocytes % (Manual)   


 


Basophils % (Manual)  2.0 H  


 


Nucleated RBC %  7.0 H  


 


Seg Neutrophils # Man  0.0 L  


 


Lymphocytes # (Manual)  0.0 L  


 


D-Dimer   891.58 H 


 


POC ABG pH   


 


POC ABG pCO2   


 


POC ABG pO2   


 


Chloride   


 


Carbon Dioxide    15 L


 


BUN    30 H


 


Creatinine    1.8 H


 


Glucose    259 H


 


Calcium   


 


AST    55 H


 


Troponin T    0.118 H*


 


NT-Pro-B Natriuret Pep   


 


Urine WBC (Auto)   


 


Urine Creatinine   


 


Urine Total Protein   


 


Crossmatch   














  12/02/19 12/02/19 12/02/19





  06:58 07:25 08:40


 


WBC   


 


RBC   


 


Hgb   


 


Hct   


 


MCV   


 


MCHC   


 


RDW   


 


Plt Count   


 


Seg Neuts % (Manual)   


 


Lymphocytes % (Manual)   


 


Basophils % (Manual)   


 


Nucleated RBC %   


 


Seg Neutrophils # Man   


 


Lymphocytes # (Manual)   


 


D-Dimer   


 


POC ABG pH  7.072 L  


 


POC ABG pCO2  47.1 H  


 


POC ABG pO2  327 H  


 


Chloride   


 


Carbon Dioxide   


 


BUN   


 


Creatinine   


 


Glucose   


 


Calcium   


 


AST   


 


Troponin T   


 


NT-Pro-B Natriuret Pep   4210 H 


 


Urine WBC (Auto)   


 


Urine Creatinine   


 


Urine Total Protein   


 


Crossmatch    See Detail














  12/02/19 12/02/19 12/03/19





  14:51 18:26 04:23


 


WBC   


 


RBC   


 


Hgb   


 


Hct   


 


MCV   


 


MCHC   


 


RDW   


 


Plt Count   


 


Seg Neuts % (Manual)   


 


Lymphocytes % (Manual)   


 


Basophils % (Manual)   


 


Nucleated RBC %   


 


Seg Neutrophils # Man   


 


Lymphocytes # (Manual)   


 


D-Dimer   


 


POC ABG pH   


 


POC ABG pCO2   


 


POC ABG pO2   


 


Chloride   


 


Carbon Dioxide   


 


BUN    44 H


 


Creatinine    1.8 H


 


Glucose    154 H


 


Calcium    8.3 L


 


AST   


 


Troponin T  0.579 H* D  1.150 H* D  1.160 H*


 


NT-Pro-B Natriuret Pep   


 


Urine WBC (Auto)   


 


Urine Creatinine   


 


Urine Total Protein   


 


Crossmatch   














  12/03/19 12/03/19 12/03/19





  07:01 19:55 19:55


 


WBC  2.3 L  


 


RBC  2.43 L  


 


Hgb  7.6 L  


 


Hct  22.9 L  


 


MCV  95 H  


 


MCHC   


 


RDW  23.8 H  


 


Plt Count  41 L  


 


Seg Neuts % (Manual)  71.0 H  


 


Lymphocytes % (Manual)   


 


Basophils % (Manual)   


 


Nucleated RBC %  1.0 H  


 


Seg Neutrophils # Man  1.6 L  


 


Lymphocytes # (Manual)  0.4 L  


 


D-Dimer   


 


POC ABG pH   


 


POC ABG pCO2   


 


POC ABG pO2   


 


Chloride   


 


Carbon Dioxide   


 


BUN   


 


Creatinine   


 


Glucose   


 


Calcium   


 


AST   


 


Troponin T   


 


NT-Pro-B Natriuret Pep   


 


Urine WBC (Auto)   21.0 H 


 


Urine Creatinine    75.4 H


 


Urine Total Protein    25 H


 


Crossmatch   














  12/04/19 12/04/19 12/05/19





  03:42 03:42 04:45


 


WBC  2.6 L   2.0 L


 


RBC  2.46 L   2.24 L


 


Hgb  7.8 L   7.0 L


 


Hct  23.6 L   21.4 L


 


MCV  96 H   96 H


 


MCHC   


 


RDW  24.0 H   22.8 H


 


Plt Count  52 L   48 L


 


Seg Neuts % (Manual)   


 


Lymphocytes % (Manual)    38.0 H


 


Basophils % (Manual)   


 


Nucleated RBC %    1.0 H


 


Seg Neutrophils # Man    1.2 L


 


Lymphocytes # (Manual)    0.8 L


 


D-Dimer   


 


POC ABG pH   


 


POC ABG pCO2   


 


POC ABG pO2   


 


Chloride   107.9 H 


 


Carbon Dioxide   


 


BUN   47 H 


 


Creatinine   1.6 H 


 


Glucose   130 H 


 


Calcium   


 


AST   


 


Troponin T   


 


NT-Pro-B Natriuret Pep   


 


Urine WBC (Auto)   


 


Urine Creatinine   


 


Urine Total Protein   


 


Crossmatch   














  12/05/19 12/06/19 12/06/19





  04:45 07:21 07:21


 


WBC    1.6 L*


 


RBC    2.28 L


 


Hgb    7.3 L


 


Hct    21.3 L


 


MCV   


 


MCHC   


 


RDW    22.5 H


 


Plt Count    39 L


 


Seg Neuts % (Manual)   


 


Lymphocytes % (Manual)    46.0 H


 


Basophils % (Manual)   


 


Nucleated RBC %    1.0 H


 


Seg Neutrophils # Man    0.8 L


 


Lymphocytes # (Manual)    0.7 L


 


D-Dimer   


 


POC ABG pH   


 


POC ABG pCO2   


 


POC ABG pO2   


 


Chloride   


 


Carbon Dioxide   


 


BUN  34 H  27 H 


 


Creatinine   


 


Glucose  130 H  113 H 


 


Calcium  8.2 L  


 


AST   


 


Troponin T   0.767 H* D 


 


NT-Pro-B Natriuret Pep   


 


Urine WBC (Auto)   


 


Urine Creatinine   


 


Urine Total Protein   


 


Crossmatch

## 2019-12-06 NOTE — PROGRESS NOTE
Assessment and Plan





Impression:


* Nonoliguric PRAKASH vs underlying chronic kidney disease


   --SCr 1.1mg/dL in Nov 2019 - per outpatient hematology labs 


   --UPCR 330mg (Dec 2019)


* NSTEMI


   --TTE (Dec 2019):  LVEF 50-55%, mild AR, mild AS, mod MR, mod pulmonary HTN, 

no pericardial effusion


* Acute hypoxic/hypercapnic respiratory failure


* Pyruia/hematuria r/o UTI


   --Urine cx:  <10k CFU


* COPD


* Myelodysplastic syndrome


* Type II DM


* Hypertension


* Nephrolithiasis, non obstructing





Plan:


* Renal function improved, SCr trending down


* Hold diuretics today


* Cardiology recommendations noted - TTE reviewed


* Pulmonary following


* Avoid potential nephrotoxins


* Dose medications for renal function


* 2g Na restricted diet


* AM labs ordered


* Strict I/O





Subjective


Date of service: 12/06/19


Principal diagnosis: COPD exac; resp failure; anemia; MDS; NSTEMI


Interval history: 





Patient reports that he ambulated halls today.





Objective





- Vital Signs


Vital signs: 


                               Vital Signs - 12hr











  12/06/19 12/06/19 12/06/19





  09:04 10:00 12:34


 


Temperature 98.2 F  98.3 F


 


Pulse Rate 168 H 61 90


 


Respiratory 18 18 18





Rate   


 


Blood Pressure 126/92  114/48


 


O2 Sat by Pulse 98  98





Oximetry   














  12/06/19





  12:41


 


Temperature 98.2 F


 


Pulse Rate 61


 


Respiratory 18





Rate 


 


Blood Pressure 148/84


 


O2 Sat by Pulse 100





Oximetry 














- General Appearance


General appearance: well-developed, well-nourished


EENT: ATNC


Respiratory: Present: Decreased Breath Sounds


Cardiology: regular, S1S2


Gastrointestinal: normal, no tenderness, no distended


Integumentary: no rash, warm and dry


Musculoskeletal: other (no edema)


Psychiatric: cooperative





- Lab





                                 12/06/19 07:21





                                 12/06/19 07:21


                             Most recent lab results











Calcium  8.5 mg/dL (8.4-10.2)   12/06/19  07:21    


 


Urine Creatinine  75.4 mg/dL (0.1-20.0)  H  12/03/19  19:55    


 


Urine Sodium  45 mmol/L  12/03/19  19:55    


 


Urine Total Protein  25 mg/dL (5-11.8)  H  12/03/19  19:55    














Medications & Allergies





- Medications


Allergies/Adverse Reactions: 


                                    Allergies





morphine Adverse Reaction (Verified 12/18/18 16:36)


   HALLUCINATIONS


IVP DYE Adverse Reaction (Uncoded 12/18/18 16:36)


   Hives








Home Medications: 


                                Home Medications











 Medication  Instructions  Recorded  Confirmed  Last Taken  Type


 


Aspirin EC [Aspirin Enteric Coated 81 mg PO QDAY 05/05/16 12/02/19 10 Days Ago 

History





TAB]    ~12/17/18 


 


Metoprolol [Lopressor] 100 mg PO DAILY 05/05/16 12/04/19 12/27/18 04:30 History


 


Pravastatin Sodium [Pravastatin] 20 mg PO QHS 05/05/16 12/04/19 12/27/18 04:30 

History


 


amLODIPine [Norvasc] 5 mg PO DAILY 05/05/16 12/02/19 12/27/18 04:30 History


 


ALBUTEROL Inhaler(NF) [VENTOLIN 2 puff IH QDAY PRN 12/18/18 12/02/19 Unknown Hi

story





Inhaler(NF)]     


 


Fluticasone [Flonase] 1 spray NS QDAY 12/18/18 12/04/19 12/26/18 History


 


Glucosam-Chondro-Herb 149-Hyal 1,500 mg PO BID 12/18/18 12/02/19 10 Days Ago 

History





[Glucosamine-Chondr Complex Tab]    ~12/17/18 


 


Multivit-Min/FA/Lycopen/Lutein 1 each PO DAILY 12/18/18 12/04/19 10 Days Ago 

History





[Centrum Silver Tablet]    ~12/17/18 


 


Omega-3 Fatty Acids/Fish Oil [Fish 1 each PO DAILY 12/18/18 12/04/19 10 Days Ago

 History





Oil 1,000 mg Capsule]    ~12/17/18 


 


Umeclidinium Brm/Vilanterol Tr 1 each IH DAILY 12/18/18 12/04/19 12/27/18 04:30 

History





[Anoro Ellipta 62.5-25 Mcg INH]     


 


Metoprolol Succinate [Toprol Xl] 100 mg PO DAILY 12/02/19 12/04/19 Unknown 

History


 


Multivit-Min/FA/Lycopen/Lutein 1 each PO DAILY 12/02/19 12/04/19 Unknown History





[Centrum Silver Tablet]     











Active Medications: 














Generic Name Dose Route Start Last Admin





  Trade Name Freq  PRN Reason Stop Dose Admin


 


Acetaminophen  650 mg  12/02/19 15:40 





  Tylenol  PO  





  Q4H PRN  





  Pain MILD(1-3)/Fever >100.5/HA  


 


Albuterol  2.5 mg  12/04/19 16:38 





  Proventil  IH  





  Q4HRT PRN  





  Shortness Of Breath  


 


Atorvastatin Calcium  10 mg  12/02/19 22:00  12/05/19 21:33





  Atorvastatin  PO   10 mg





  QHS LINUS   Administration


 


Diltiazem HCl  60 mg  12/06/19 22:00 





  Cardizem  PO  





  BID LINUS  


 


Ondansetron HCl  4 mg  12/02/19 15:40 





  Zofran  IV  





  Q8H PRN  





  Nausea And Vomiting  


 


Sodium Chloride  10 ml  12/02/19 22:00  12/06/19 09:08





  Sodium Chloride Flush Syringe 10 Ml  IV   10 ml





  BID LINUS   Administration


 


Sodium Chloride  10 ml  12/02/19 15:40  12/02/19 17:48





  Sodium Chloride Flush Syringe 10 Ml  IV   10 ml





  PRN PRN   Administration





  LINE FLUSH

## 2019-12-07 NOTE — DISCHARGE SUMMARY
Providers





- Providers


Date of Admission: 


12/02/19 08:38





Date of discharge: 12/07/19


Attending physician: 


MATTHEW HERNANDEZ





                                        





12/02/19 13:18


Consult to Cardiology [CONS] Routine 


   Consulting Provider: JANENE MANZANO


   Reason For Exam: elevated troponin, abnormal ECG





12/02/19 15:40


Consult to Physician [CONS] Routine 


   Comment: 


   Consulting Provider: BARRON LOGAN


   Physician Instructions: 


   Reason For Exam: arf


Consult to Physician [CONS] Routine 


   Comment: 


   Consulting Provider: GORDON KIDD


   Physician Instructions: 


   Reason For Exam: mds





12/05/19 17:40


Consult to Physician [CONS] Routine 


   Comment: 


   Consulting Provider: MUSA NELSON


   Physician Instructions: 


   Reason For Exam: mds











Primary care physician: 


PRIMARY CARE MD








Hospitalization


Condition: Fair


Hospital course: 


Patient is 74-year-old male presents to the emergency department via EMS from 

home as unresponsive.  EMS says that he has a elderly wife who was only able to 

provide information that he has a history of COPD and myelodysplastic syndrome. 

 He also has a previous history of colon cancer with liver metastasis, but it is

 unknown whether or not this is in remission.  The patient did present altered 

and unresponsive per ER physician.  However within 5 minutes of arrival the 

patient began talking and became more responsive, although still does displayed 

some confusion.  Patient admits to having oxygen at home for his COPD but has 

not been using it.  Patient initially had a room air pulse ox in the 50s that 

went up to close to 100% with some bag valve ventilation. 





Acute hypoxic resp failure. Due to pulmonary edema. Patient evaluated by 

Pulmonology  





paroxysmal atrial fibrillation with RVR.  New onset.  Continue cardizem for rate

 control.   No systemic AC at this time in setting of significant anemia and 

thrombocytopenia. 





Elevated trop.  Cardiology consulted.  





Myelodysplastic syndrome. he was evaluated by Hematologist. Follow as outpatient





PRAKASH due to vasomotor nephropathy.





Anemia. s/p 2 unit PRBC transfused





Total time spent on discharge, 35 mins








Disposition: DC-01 TO HOME OR SELFCARE





- Discharge Diagnoses


(1) Acute metabolic encephalopathy


Status: Acute   





(2) PRAKASH (acute kidney injury)


Status: Acute   





(3) Acute and chronic respiratory failure


Status: Acute   





(4) Anemia requiring transfusions


Status: Acute   





(5) Elevated troponin


Status: Acute   





(6) Paroxysmal atrial fibrillation with RVR


Status: Acute   





(7) Thrombocytopenia


Status: Acute   





(8) COPD (chronic obstructive pulmonary disease)


Status: Chronic   





(9) Diabetes


Status: Chronic   





(10) History of colon cancer


Status: Chronic   





(11) Myelodysplastic syndrome


Status: Chronic   





(12) Tobacco use


Status: Chronic   





Core Measure Documentation





- Palliative Care


Palliative Care/ Comfort Measures: Not Applicable





- Core Measures


Any of the following diagnoses?: none





Exam





- Constitutional


Vitals: 


                                        











Temp Pulse Resp BP Pulse Ox


 


 98.4 F   87   18   127/44   98 


 


 12/07/19 05:15  12/07/19 10:38  12/07/19 07:37  12/07/19 10:38  12/07/19 09:15














Plan


Activity: advance as tolerated


Diet: low fat, low cholesterol, low salt


Plan of Treatment: 


1.Follow up with PCP in 3-5 days.


2.Follow up with Dr. Kidd in 2-3 days


3.Follow up with Dr. Jaffe in 1 week


4.Follow up with Dr. Izaguirre, cardiology in 1 week


5.Follow up with Dr. gillette, Pulm in 1 week


6.Home Oxygen continuous at 2l/min NC


Follow up with: 


PRIMARY CARE,MD [Primary Care Provider] - 7 Days

## 2019-12-07 NOTE — PROGRESS NOTE
Assessment and Plan





Impression:


* Nonoliguric PRAKASH vs underlying chronic kidney disease


   --SCr 1.1mg/dL in Nov 2019 - per outpatient hematology labs 


   --UPCR 330mg (Dec 2019)


* NSTEMI


   --TTE (Dec 2019):  LVEF 50-55%, mild AR, mild AS, mod MR, mod pulmonary HTN, 

no pericardial effusion


* Acute hypoxic/hypercapnic respiratory failure


* Pyruia/hematuria r/o UTI


   --Urine cx:  <10k CFU


* COPD


* Myelodysplastic syndrome


* Type II DM


* Hypertension


* Nephrolithiasis, non obstructing


* Paroxysmal atrial fibrillation with RVR





Plan:


* Renal function has improved, SCr trending down


* Diuresis prn


* Cardiology recommendations noted - TTE reviewed


* Avoid potential nephrotoxins


* Dose medications for renal function


* 2g Na restricted diet


* Strict I/O


* Discharge planning in progress





Subjective


Date of service: 12/07/19


Principal diagnosis: COPD exac; resp failure; anemia; MDS; NSTEMI


Interval history: 





Patient reports that he is feeling better.  Hypoxic w/ ambulation - O2 sat 

declines to 88%.  Home oxygen being arranged.





Objective





- Vital Signs


Vital signs: 


                               Vital Signs - 12hr











  12/07/19 12/07/19 12/07/19





  05:14 05:15 07:37


 


Temperature  98.4 F 


 


Pulse Rate 76  


 


Respiratory 18  18





Rate   


 


Blood Pressure 138/50  


 


O2 Sat by Pulse 99  





Oximetry   














  12/07/19 12/07/19





  09:15 10:38


 


Temperature  


 


Pulse Rate 87 87


 


Respiratory  





Rate  


 


Blood Pressure 127/44 127/44


 


O2 Sat by Pulse 98 





Oximetry  














- General Appearance


General appearance: well-developed, well-nourished


EENT: ATNC


Respiratory: Present: Decreased Breath Sounds


Cardiology: regular, S1S2


Gastrointestinal: normal, no tenderness, no distended


Integumentary: no rash, warm and dry


Neurologic: no focal deficit


Musculoskeletal: other (no edema)


Psychiatric: cooperative





- Lab





                                 12/06/19 07:21





                                 12/07/19 14:09


                             Most recent lab results











Calcium  8.5 mg/dL (8.4-10.2)   12/06/19  07:21    


 


Urine Creatinine  75.4 mg/dL (0.1-20.0)  H  12/03/19  19:55    


 


Urine Sodium  45 mmol/L  12/03/19  19:55    


 


Urine Total Protein  25 mg/dL (5-11.8)  H  12/03/19  19:55    














Medications & Allergies





- Medications


Allergies/Adverse Reactions: 


                                    Allergies





morphine Adverse Reaction (Verified 12/18/18 16:36)


   HALLUCINATIONS


IVP DYE Adverse Reaction (Uncoded 12/18/18 16:36)


   Hives








Home Medications: 


                                Home Medications











 Medication  Instructions  Recorded  Confirmed  Last Taken  Type


 


Aspirin EC [Aspirin Enteric Coated 81 mg PO QDAY 05/05/16 12/02/19 10 Days Ago H

istory





TAB]    ~12/17/18 


 


Metoprolol [Lopressor] 100 mg PO DAILY 05/05/16 12/04/19 12/27/18 04:30 History


 


Pravastatin Sodium [Pravastatin] 20 mg PO QHS 05/05/16 12/04/19 12/27/18 04:30 

History


 


amLODIPine [Norvasc] 5 mg PO DAILY 05/05/16 12/02/19 12/27/18 04:30 History


 


ALBUTEROL Inhaler(NF) [VENTOLIN 2 puff IH QDAY PRN 12/18/18 12/02/19 Unknown 

History





Inhaler(NF)]     


 


Fluticasone [Flonase] 1 spray NS QDAY 12/18/18 12/04/19 12/26/18 History


 


Glucosam-Chondro-Herb 149-Hyal 1,500 mg PO BID 12/18/18 12/02/19 10 Days Ago 

History





[Glucosamine-Chondr Complex Tab]    ~12/17/18 


 


Multivit-Min/FA/Lycopen/Lutein 1 each PO DAILY 12/18/18 12/04/19 10 Days Ago 

History





[Centrum Silver Tablet]    ~12/17/18 


 


Omega-3 Fatty Acids/Fish Oil [Fish 1 each PO DAILY 12/18/18 12/04/19 10 Days Ago

 History





Oil 1,000 mg Capsule]    ~12/17/18 


 


Umeclidinium Brm/Vilanterol Tr 1 each IH DAILY 12/18/18 12/04/19 12/27/18 04:30 

History





[Anoro Ellipta 62.5-25 Mcg INH]     


 


Metoprolol Succinate [Toprol Xl] 100 mg PO DAILY 12/02/19 12/04/19 Unknown 

History


 


Multivit-Min/FA/Lycopen/Lutein 1 each PO DAILY 12/02/19 12/04/19 Unknown History





[Centrum Silver Tablet]     











Active Medications: 














Generic Name Dose Route Start Last Admin





  Trade Name Freq  PRN Reason Stop Dose Admin


 


Acetaminophen  650 mg  12/02/19 15:40 





  Tylenol  PO  





  Q4H PRN  





  Pain MILD(1-3)/Fever >100.5/HA  


 


Albuterol  2.5 mg  12/04/19 16:38 





  Proventil  IH  





  Q4HRT PRN  





  Shortness Of Breath  


 


Atorvastatin Calcium  10 mg  12/02/19 22:00  12/06/19 21:22





  Atorvastatin  PO   10 mg





  QHS LINUS   Administration


 


Diltiazem HCl  60 mg  12/06/19 22:00  12/07/19 10:38





  Cardizem  PO   60 mg





  BID LINUS   Administration


 


Ondansetron HCl  4 mg  12/02/19 15:40 





  Zofran  IV  





  Q8H PRN  





  Nausea And Vomiting  


 


Sodium Chloride  10 ml  12/02/19 22:00  12/07/19 10:38





  Sodium Chloride Flush Syringe 10 Ml  IV   10 ml





  BID LINUS   Administration


 


Sodium Chloride  10 ml  12/02/19 15:40  12/02/19 17:48





  Sodium Chloride Flush Syringe 10 Ml  IV   10 ml





  PRN PRN   Administration





  LINE FLUSH

## 2019-12-07 NOTE — PROGRESS NOTE
Assessment and Plan





73 y/o male with known COPD admitted with worsening shortness of breath, acute 

on chronic respiratory failure most likely secondary to pulmonary edema with low

lung reserve and also pancyotopenia.





No new recommendations for today.  Await Heme eval. Weaned to room air.  Will 

sign off.  Call if questions.





1.  Do not feel in overt COPD exacerbation so no need for systemic steroids.


2.  Smoking cessation counseling again at bedside


3.  Has home O2 already


4.  Not overly concerned about pneumonia given rapid improvement.  Suggest 

checking procalcitonin.  If normal or negative, would stop abx


5.  Has responded well to lasix therapy.


6.  Will add PRN neb therapy.





Subjective


Date of service: 12/07/19


Principal diagnosis: COPD exac; resp failure; anemia; MDS; NSTEMI


Interval history: 





Remains on room air. Stable.





Objective


                               Vital Signs - 12hr











  12/07/19 12/07/19 12/07/19





  05:14 05:15 07:37


 


Temperature  98.4 F 


 


Pulse Rate 76  


 


Respiratory 18  18





Rate   


 


Blood Pressure 138/50  


 


O2 Sat by Pulse 99  





Oximetry   














  12/07/19 12/07/19





  09:15 10:38


 


Temperature  


 


Pulse Rate 87 87


 


Respiratory  





Rate  


 


Blood Pressure 127/44 127/44


 


O2 Sat by Pulse 98 





Oximetry  











Constitutional: no acute distress, alert


ENT: other (poor dentition)


Neck: supple


Ascultation: Bilateral: rales (at the bases)


Percussion: Bilateral: not dull


Tactile fremitus: Bilateral: normal


Cardiovascular: regular rate and rhythm


Gastrointestinal: normoactive bowel sounds


CBC and BMP: 


                                 12/06/19 07:21





                                 12/07/19 14:09


ABG, PT/INR, D-dimer: 


ABG











POC ABG pH  7.072  (7.35-7.45)  L  12/02/19  06:58    


 


POC ABG pCO2  47.1  (35-45)  H  12/02/19  06:58    


 


POC ABG pO2  327  ()  H  12/02/19  06:58    


 


POC ABG HCO3  13.7  (22-26 mml/L)   12/02/19  06:58    


 


POC ABG Total CO2  15  (23-27mmol/L)   12/02/19  06:58    


 


POC ABG O2 Sat  100   12/02/19  06:58    





PT/INR, D-dimer











D-Dimer  891.58 ng/mlDDU (0-234)  H  12/02/19  06:58    








Abnormal lab findings: 


                                  Abnormal Labs











  12/02/19 12/02/19 12/02/19





  06:58 06:58 06:58


 


WBC   


 


RBC  1.99 L  


 


Hgb  6.3 L  


 


Hct  21.1 L  


 


MCV  106 H  


 


MCHC  30 L  


 


RDW  30.0 H  


 


Plt Count  95 L  


 


Seg Neuts % (Manual)   


 


Lymphocytes % (Manual)   


 


Basophils % (Manual)  2.0 H  


 


Nucleated RBC %  7.0 H  


 


Seg Neutrophils # Man  0.0 L  


 


Lymphocytes # (Manual)  0.0 L  


 


D-Dimer   891.58 H 


 


POC ABG pH   


 


POC ABG pCO2   


 


POC ABG pO2   


 


Chloride   


 


Carbon Dioxide    15 L


 


BUN    30 H


 


Creatinine    1.8 H


 


Glucose    259 H


 


Calcium   


 


AST    55 H


 


Troponin T    0.118 H*


 


NT-Pro-B Natriuret Pep   


 


Urine WBC (Auto)   


 


Urine Creatinine   


 


Urine Total Protein   


 


Crossmatch   














  12/02/19 12/02/19 12/02/19





  06:58 07:25 08:40


 


WBC   


 


RBC   


 


Hgb   


 


Hct   


 


MCV   


 


MCHC   


 


RDW   


 


Plt Count   


 


Seg Neuts % (Manual)   


 


Lymphocytes % (Manual)   


 


Basophils % (Manual)   


 


Nucleated RBC %   


 


Seg Neutrophils # Man   


 


Lymphocytes # (Manual)   


 


D-Dimer   


 


POC ABG pH  7.072 L  


 


POC ABG pCO2  47.1 H  


 


POC ABG pO2  327 H  


 


Chloride   


 


Carbon Dioxide   


 


BUN   


 


Creatinine   


 


Glucose   


 


Calcium   


 


AST   


 


Troponin T   


 


NT-Pro-B Natriuret Pep   4210 H 


 


Urine WBC (Auto)   


 


Urine Creatinine   


 


Urine Total Protein   


 


Crossmatch    See Detail














  12/02/19 12/02/19 12/03/19





  14:51 18:26 04:23


 


WBC   


 


RBC   


 


Hgb   


 


Hct   


 


MCV   


 


MCHC   


 


RDW   


 


Plt Count   


 


Seg Neuts % (Manual)   


 


Lymphocytes % (Manual)   


 


Basophils % (Manual)   


 


Nucleated RBC %   


 


Seg Neutrophils # Man   


 


Lymphocytes # (Manual)   


 


D-Dimer   


 


POC ABG pH   


 


POC ABG pCO2   


 


POC ABG pO2   


 


Chloride   


 


Carbon Dioxide   


 


BUN    44 H


 


Creatinine    1.8 H


 


Glucose    154 H


 


Calcium    8.3 L


 


AST   


 


Troponin T  0.579 H* D  1.150 H* D  1.160 H*


 


NT-Pro-B Natriuret Pep   


 


Urine WBC (Auto)   


 


Urine Creatinine   


 


Urine Total Protein   


 


Crossmatch   














  12/03/19 12/03/19 12/03/19





  07:01 19:55 19:55


 


WBC  2.3 L  


 


RBC  2.43 L  


 


Hgb  7.6 L  


 


Hct  22.9 L  


 


MCV  95 H  


 


MCHC   


 


RDW  23.8 H  


 


Plt Count  41 L  


 


Seg Neuts % (Manual)  71.0 H  


 


Lymphocytes % (Manual)   


 


Basophils % (Manual)   


 


Nucleated RBC %  1.0 H  


 


Seg Neutrophils # Man  1.6 L  


 


Lymphocytes # (Manual)  0.4 L  


 


D-Dimer   


 


POC ABG pH   


 


POC ABG pCO2   


 


POC ABG pO2   


 


Chloride   


 


Carbon Dioxide   


 


BUN   


 


Creatinine   


 


Glucose   


 


Calcium   


 


AST   


 


Troponin T   


 


NT-Pro-B Natriuret Pep   


 


Urine WBC (Auto)   21.0 H 


 


Urine Creatinine    75.4 H


 


Urine Total Protein    25 H


 


Crossmatch   














  12/04/19 12/04/19 12/05/19





  03:42 03:42 04:45


 


WBC  2.6 L   2.0 L


 


RBC  2.46 L   2.24 L


 


Hgb  7.8 L   7.0 L


 


Hct  23.6 L   21.4 L


 


MCV  96 H   96 H


 


MCHC   


 


RDW  24.0 H   22.8 H


 


Plt Count  52 L   48 L


 


Seg Neuts % (Manual)   


 


Lymphocytes % (Manual)    38.0 H


 


Basophils % (Manual)   


 


Nucleated RBC %    1.0 H


 


Seg Neutrophils # Man    1.2 L


 


Lymphocytes # (Manual)    0.8 L


 


D-Dimer   


 


POC ABG pH   


 


POC ABG pCO2   


 


POC ABG pO2   


 


Chloride   107.9 H 


 


Carbon Dioxide   


 


BUN   47 H 


 


Creatinine   1.6 H 


 


Glucose   130 H 


 


Calcium   


 


AST   


 


Troponin T   


 


NT-Pro-B Natriuret Pep   


 


Urine WBC (Auto)   


 


Urine Creatinine   


 


Urine Total Protein   


 


Crossmatch   














  12/05/19 12/06/19 12/06/19





  04:45 07:21 07:21


 


WBC    1.6 L*


 


RBC    2.28 L


 


Hgb    7.3 L


 


Hct    21.3 L


 


MCV   


 


MCHC   


 


RDW    22.5 H


 


Plt Count    39 L


 


Seg Neuts % (Manual)   


 


Lymphocytes % (Manual)    46.0 H


 


Basophils % (Manual)   


 


Nucleated RBC %    1.0 H


 


Seg Neutrophils # Man    0.8 L


 


Lymphocytes # (Manual)    0.7 L


 


D-Dimer   


 


POC ABG pH   


 


POC ABG pCO2   


 


POC ABG pO2   


 


Chloride   


 


Carbon Dioxide   


 


BUN  34 H  27 H 


 


Creatinine   


 


Glucose  130 H  113 H 


 


Calcium  8.2 L  


 


AST   


 


Troponin T   0.767 H* D 


 


NT-Pro-B Natriuret Pep   


 


Urine WBC (Auto)   


 


Urine Creatinine   


 


Urine Total Protein   


 


Crossmatch   














  12/07/19





  14:09


 


WBC 


 


RBC 


 


Hgb 


 


Hct 


 


MCV 


 


MCHC 


 


RDW 


 


Plt Count 


 


Seg Neuts % (Manual) 


 


Lymphocytes % (Manual) 


 


Basophils % (Manual) 


 


Nucleated RBC % 


 


Seg Neutrophils # Man 


 


Lymphocytes # (Manual) 


 


D-Dimer 


 


POC ABG pH 


 


POC ABG pCO2 


 


POC ABG pO2 


 


Chloride 


 


Carbon Dioxide 


 


BUN  25 H


 


Creatinine 


 


Glucose  194 H


 


Calcium  8.3 L


 


AST 


 


Troponin T 


 


NT-Pro-B Natriuret Pep 


 


Urine WBC (Auto) 


 


Urine Creatinine 


 


Urine Total Protein 


 


Crossmatch

## 2019-12-07 NOTE — PROGRESS NOTE
Assessment and Plan





Pt was noted to develop paroxysmal atrial fibrillation with RVR yesterday AM 

which is new onset. On evaluation, he has spontaneously converted back to NSR. 

Optimize HR - initiate PO cardizem. No systemic AC at this time in setting of 

significant anemia and thrombocytopenia. pt has hx of colon ca and 

myelodysplastic synd, chronic tob abuse


Can consider ischemic evaluation once medically stabilized as OP.


Await hematology consultation.





cardiac status stable


continue current mgt


per pulm,hematol





Subjective


Date of service: 12/07/19


Principal diagnosis: COPD exac; resp failure; anemia; MDS; NSTEMI


Interval history: 


pt known to me


resting quietly


vss


tele sinus rhythm











Objective


                                   Vital Signs











  Temp Pulse Resp BP Pulse Ox


 


 12/07/19 07:37    18  


 


 12/07/19 05:15  98.4 F    


 


 12/07/19 05:14   76  18  138/50  99


 


 12/07/19 00:05  98.7 F    


 


 12/07/19 00:03   81  18  114/31  97


 


 12/06/19 22:00   84  18  


 


 12/06/19 20:24  98.9 F    


 


 12/06/19 20:22   91 H  18  118/46  99


 


 12/06/19 16:22  97.5 F L  83  18  114/47  98


 


 12/06/19 12:41  98.2 F  61  18  148/84  100


 


 12/06/19 12:34  98.3 F  90  18  114/48  98


 


 12/06/19 10:00   61  18  


 


 12/06/19 09:04  98.2 F  168 H  18  126/92  98














- Physical Examination


General: No Apparent Distress


HEENT: Positive: PERRL, Normocephaly, Mucus Membranes Moist


Neck: Positive: neck supple, trachea midline


Cardiac: Positive: Reg Rate and Rhythm


Lungs: Positive: clear to auscultation


Neuro: Positive: Grossly Intact


Abdomen: Positive: Soft.  Negative: Tender


Skin: Negative: Rash


Extremities: Present: edema (trace BLE)





- Labs and Meds


                                       CBC











  12/06/19 Range/Units





  07:21 


 


Plt Count  39 L  (140-440)  K/mm3














- Imaging and Cardiology


EKG: report reviewed, image reviewed


Echo: report reviewed (11/2019: EF 50-55%, pseudonormalization, mild AR, mild 

AS, mod MR, mod pulm HTN. 5/2011 showed EF 50-55%, mild LVH, mild MR, mild AR, 

trivial TR. )





- EKG


Sinus rhythms and dysrhythmias: sinus rhythm


Repolarization changes or abnormalities: ST or T wave suggestive of ischemia

## 2019-12-07 NOTE — CONSULTATION
REFERRED BY:  Dr. Clement.



REASON FOR CONSULTATION:

1.  History of MDS, awaiting chemotherapy start.

2.  History of colon cancer many years ago.



HISTORY OF PRESENT ILLNESS:  I saw the patient.  He is a 74-year-old male in the

medical floor.  He was found unresponsive at home.  He was found to be anemic. 

Transfusion was given.  He has a history of COPD, colon cancer was many years

ago with liver mets.  Surgery was done and he is in remission.  For his MDS,

there is a plan to start chemotherapy by Dr. Beltran in the clinic setting.  He

came to the hospital because of unresponsiveness.  After a short time in the

hospital, he became more responsive. During this admission, the patient has been

seen by ICU team, Nephrology team and Cardiology team.  I have been asked to

evaluate regarding the MDS.  I spoke to Dr. Beltran and there is a plan to start

him of chemotherapy.



PAST MEDICAL HISTORY:  COPD, tobacco usage.  Others as above, diabetes,

hypertension.



PAST SURGICAL HISTORY:  Not contributory.



SOCIAL HISTORY:  Smoking.



FAMILY HISTORY:  Noncontributory.



ALLERGIES:  IV DYE.



HOME MEDICATIONS:  Reviewed.



PHYSICAL EXAMINATION:

VITAL SIGNS:  Temperature 98, pulse 84, respirations 18, /46.

HEENT:  Pallor present, no icterus.

NECK:  No neck lymph nodes.

HEART:  S1, S2.

LUNGS:  Decreased air entry.

ABDOMEN:  Soft.

EXTREMITIES:  No calf tenderness.



LABORATORY DATA:  At admission, white cell 5, hemoglobin 6.3, , platelet

195, and today, white cell 1.6, hemoglobin 7.3, MCV 94, platelets 39.  Potassium

is 3.7, creatinine 1.3, calcium 8.5.



RADIOLOGY:  Head CT was done, nil acute.



ASSESSMENT AND PLAN:

1.  Myelodysplastic syndrome being followed by Dr. Beltran.  There is a plan for

chemotherapy.

2.  History of colon cancer with liver mets, status post surgery.  This was 20

years ago.

3.  Anemia secondary to myelodysplastic syndrome, transfusion support given.

4.  Leukopenia secondary to myelodysplastic syndrome.

5.  Thrombocytopenia secondary to myelodysplastic syndrome, platelet count is

slightly low.  This may be secondary to medications.  I will follow along during

the patient's stay and discharge and he will be seen by Dr. Beltran in the clinic

setting.  He came with syncopal episode.  Cardiology team, Nephrology team and

ICU team has seen the patient.





DD: 12/07/2019 15:46

DT: 12/07/2019 21:14

JOB# [id number]  [id number]

NM/ANISA

## 2019-12-07 NOTE — PROGRESS NOTE
Hospitalist Physical





- Constitutional


Vitals: 


                                        











Temp Pulse Resp BP Pulse Ox


 


 98.4 F   87   18   127/44   98 


 


 12/07/19 05:15  12/07/19 10:38  12/07/19 07:37  12/07/19 10:38  12/07/19 09:15











General appearance: Present: no acute distress





Results





- Labs


CBC & Chem 7: 


                                 12/06/19 07:21





                                 12/06/19 07:21


Labs: 


                             Laboratory Last Values











WBC  1.6 K/mm3 (4.5-11.0)  L*  12/06/19  07:21    


 


RBC  2.28 M/mm3 (3.65-5.03)  L  12/06/19  07:21    


 


Hgb  7.3 gm/dl (11.8-15.2)  L  12/06/19  07:21    


 


Hct  21.3 % (35.5-45.6)  L  12/06/19  07:21    


 


MCV  94 fl (84-94)   12/06/19  07:21    


 


MCH  32 pg (28-32)   12/06/19  07:21    


 


MCHC  34 % (32-34)   12/06/19  07:21    


 


RDW  22.5 % (13.2-15.2)  H  12/06/19  07:21    


 


Plt Count  39 K/mm3 (140-440)  L  12/06/19  07:21    


 


Add Manual Diff  Complete   12/06/19  07:21    


 


Total Counted  100   12/06/19  07:21    


 


Seg Neuts % (Manual)  47.0 % (40.0-70.0)   12/06/19  07:21    


 


Band Neutrophils %  3.0 %  12/06/19  07:21    


 


Lymphocytes % (Manual)  46.0 % (13.4-35.0)  H  12/06/19  07:21    


 


Reactive Lymphs % (Man)  0 %  12/06/19  07:21    


 


Monocytes % (Manual)  4.0 % (0.0-7.3)   12/06/19  07:21    


 


Eosinophils % (Manual)  0 % (0.0-4.3)   12/06/19  07:21    


 


Basophils % (Manual)  0 % (0.0-1.8)   12/06/19  07:21    


 


Metamyelocytes %  0 %  12/06/19  07:21    


 


Myelocytes %  0 %  12/06/19  07:21    


 


Promyelocytes %  0 %  12/06/19  07:21    


 


Blast Cells %  0 %  12/06/19  07:21    


 


Nucleated RBC %  1.0 % (0.0-0.9)  H  12/06/19  07:21    


 


Seg Neutrophils # Man  0.8 K/mm3 (1.8-7.7)  L  12/06/19  07:21    


 


Band Neutrophils #  0.0 K/mm3  12/06/19  07:21    


 


Lymphocytes # (Manual)  0.7 K/mm3 (1.2-5.4)  L  12/06/19  07:21    


 


Abs React Lymphs (Man)  0.0 K/mm3  12/06/19  07:21    


 


Monocytes # (Manual)  0.1 K/mm3 (0.0-0.8)   12/06/19  07:21    


 


Eosinophils # (Manual)  0.0 K/mm3 (0.0-0.4)   12/06/19  07:21    


 


Basophils # (Manual)  0.0 K/mm3 (0.0-0.1)   12/06/19  07:21    


 


Metamyelocytes #  0.0 K/mm3  12/06/19  07:21    


 


Myelocytes #  0.0 K/mm3  12/06/19  07:21    


 


Promyelocytes #  0.0 K/mm3  12/06/19  07:21    


 


Blast Cells #  0.0 K/mm3  12/06/19  07:21    


 


WBC Morphology  Not Reportable   12/06/19  07:21    


 


Hypersegmented Neuts  Not Reportable   12/06/19  07:21    


 


Hyposegmented Neuts  Not Reportable   12/06/19  07:21    


 


Hypogranular Neuts  Not Reportable   12/06/19  07:21    


 


Smudge Cells  Not Reportable   12/06/19  07:21    


 


Toxic Granulation  Not Reportable   12/06/19  07:21    


 


Toxic Vacuolation  Not Reportable   12/06/19  07:21    


 


Dohle Bodies  Not Reportable   12/06/19  07:21    


 


Pelger-Huet Anomaly  Not Reportable   12/06/19  07:21    


 


Gasper Rods  Not Reportable   12/06/19  07:21    


 


Platelet Estimate  Consistent w auto   12/06/19  07:21    


 


Clumped Platelets  Not Reportable   12/06/19  07:21    


 


Plt Clumps, EDTA  Not Reportable   12/06/19  07:21    


 


Large Platelets  Not Reportable   12/06/19  07:21    


 


Giant Platelets  Not Reportable   12/06/19  07:21    


 


Platelet Satelliting  Not Reportable   12/06/19  07:21    


 


Plt Morphology Comment  Not Reportable   12/06/19  07:21    


 


RBC Morphology  Not Reportable   12/06/19  07:21    


 


Dimorphic RBCs  Not Reportable   12/06/19  07:21    


 


Polychromasia  Not Reportable   12/06/19  07:21    


 


Hypochromasia  Few   12/06/19  07:21    


 


Poikilocytosis  Not Reportable   12/06/19  07:21    


 


Anisocytosis  1+   12/06/19  07:21    


 


Microcytosis  Not Reportable   12/06/19  07:21    


 


Macrocytosis  Not Reportable   12/06/19  07:21    


 


Spherocytes  Not Reportable   12/06/19  07:21    


 


Pappenheimer Bodies  Not Reportable   12/06/19  07:21    


 


Sickle Cells  Not Reportable   12/06/19  07:21    


 


Target Cells  Not Reportable   12/06/19  07:21    


 


Tear Drop Cells  Few   12/06/19  07:21    


 


Ovalocytes  Not Reportable   12/06/19  07:21    


 


Helmet Cells  Not Reportable   12/06/19  07:21    


 


Chaparro-Big Bow Bodies  Not Reportable   12/06/19  07:21    


 


Cabot Rings  Not Reportable   12/06/19  07:21    


 


Estee Cells  Not Reportable   12/06/19  07:21    


 


Bite Cells  Not Reportable   12/06/19  07:21    


 


Crenated Cell  Not Reportable   12/06/19  07:21    


 


Elliptocytes  Few   12/06/19  07:21    


 


Acanthocytes (Spur)  Not Reportable   12/06/19  07:21    


 


Rouleaux  Not Reportable   12/06/19  07:21    


 


Hemoglobin C Crystals  Not Reportable   12/06/19  07:21    


 


Schistocytes  Not Reportable   12/06/19  07:21    


 


Malaria parasites  Not Reportable   12/06/19  07:21    


 


Sherif Bodies  Not Reportable   12/06/19  07:21    


 


Hem Pathologist Commnt  No   12/06/19  07:21    


 


D-Dimer  891.58 ng/mlDDU (0-234)  H  12/02/19  06:58    


 


POC ABG pH  7.072  (7.35-7.45)  L  12/02/19  06:58    


 


POC ABG pCO2  47.1  (35-45)  H  12/02/19  06:58    


 


POC ABG pO2  327  ()  H  12/02/19  06:58    


 


POC ABG HCO3  13.7  (22-26 mml/L)   12/02/19  06:58    


 


POC ABG Total CO2  15  (23-27mmol/L)   12/02/19  06:58    


 


POC ABG O2 Sat  100   12/02/19  06:58    


 


POC ABG Base Excess  -16  ((-2) - (+3)mmol/L)   12/02/19  06:58    


 


FiO2  100 %  12/02/19  06:58    


 


Sodium  141 mmol/L (137-145)   12/06/19  07:21    


 


Potassium  3.7 mmol/L (3.6-5.0)   12/06/19  07:21    


 


Chloride  102.4 mmol/L ()   12/06/19  07:21    


 


Carbon Dioxide  26 mmol/L (22-30)   12/06/19  07:21    


 


Anion Gap  16 mmol/L  12/06/19  07:21    


 


BUN  27 mg/dL (9-20)  H  12/06/19  07:21    


 


Creatinine  1.3 mg/dL (0.8-1.5)   12/06/19  07:21    


 


Estimated GFR  54 ml/min  12/06/19  07:21    


 


BUN/Creatinine Ratio  21 %  12/06/19  07:21    


 


Glucose  113 mg/dL ()  H  12/06/19  07:21    


 


Calcium  8.5 mg/dL (8.4-10.2)   12/06/19  07:21    


 


Total Bilirubin  0.90 mg/dL (0.1-1.2)   12/02/19  06:58    


 


AST  55 units/L (5-40)  H  12/02/19  06:58    


 


ALT  36 units/L (7-56)   12/02/19  06:58    


 


Alkaline Phosphatase  62 units/L ()   12/02/19  06:58    


 


Ammonia  30.0 umol/L (25-60)   12/02/19  06:58    


 


Troponin T  0.767 ng/mL (0.00-0.029)  H* D 12/06/19  07:21    


 


NT-Pro-B Natriuret Pep  4210 pg/mL (0-900)  H  12/02/19  07:25    


 


Total Protein  7.0 g/dL (6.3-8.2)   12/02/19  06:58    


 


Albumin  4.2 g/dL (3.9-5)   12/02/19  06:58    


 


Albumin/Globulin Ratio  1.5 %  12/02/19  06:58    


 


Triglycerides  46 mg/dL (2-149)   12/02/19  06:58    


 


Cholesterol  114 mg/dL ()   12/02/19  06:58    


 


LDL Cholesterol Direct  68 mg/dL ()   12/02/19  06:58    


 


HDL Cholesterol  51 mg/dL (40-59)   12/02/19  06:58    


 


Cholesterol/HDL Ratio  2.23 %  12/02/19  06:58    


 


TSH  1.590 mlU/mL (0.270-4.200)   12/02/19  06:58    


 


Urine Color  Yellow  (Yellow)   12/03/19  19:55    


 


Urine Turbidity  Clear  (Clear)   12/03/19  19:55    


 


Urine pH  5.0  (5.0-7.0)   12/03/19  19:55    


 


Ur Specific Gravity  1.014  (1.003-1.030)   12/03/19  19:55    


 


Urine Protein  30 mg/dl mg/dL (Negative)   12/03/19  19:55    


 


Urine Glucose (UA)  Neg mg/dL (Negative)   12/03/19  19:55    


 


Urine Ketones  Neg mg/dL (Negative)   12/03/19  19:55    


 


Urine Blood  Sm  (Negative)   12/03/19  19:55    


 


Urine Nitrite  Neg  (Negative)   12/03/19  19:55    


 


Urine Bilirubin  Neg  (Negative)   12/03/19  19:55    


 


Urine Urobilinogen  < 2.0 mg/dL (<2.0)   12/03/19  19:55    


 


Ur Leukocyte Esterase  Sm  (Negative)   12/03/19  19:55    


 


Urine WBC (Auto)  21.0 /HPF (0.0-6.0)  H  12/03/19  19:55    


 


Urine RBC (Auto)  4.0 /HPF (0.0-6.0)   12/03/19  19:55    


 


U Epithel Cells (Auto)  < 1.0 /HPF (0-13.0)   12/03/19  19:55    


 


Urine Mucus  Few /HPF  12/03/19  19:55    


 


Urine Creatinine  75.4 mg/dL (0.1-20.0)  H  12/03/19  19:55    


 


Protein/Creatinin Ratio  0.33   12/03/19  19:55    


 


Urine Sodium  45 mmol/L  12/03/19  19:55    


 


Urine Total Protein  25 mg/dL (5-11.8)  H  12/03/19  19:55    


 


Blood Type  O POSITIVE   12/02/19  08:40    


 


Antibody Screen  Negative   12/02/19  08:40    


 


Crossmatch  See Detail   12/02/19  08:40    














Active Medications





- Current Medications


Current Medications: 














Generic Name Dose Route Start Last Admin





  Trade Name Freq  PRN Reason Stop Dose Admin


 


Acetaminophen  650 mg  12/02/19 15:40 





  Tylenol  PO  





  Q4H PRN  





  Pain MILD(1-3)/Fever >100.5/HA  


 


Albuterol  2.5 mg  12/04/19 16:38 





  Proventil  IH  





  Q4HRT PRN  





  Shortness Of Breath  


 


Atorvastatin Calcium  10 mg  12/02/19 22:00  12/06/19 21:22





  Atorvastatin  PO   10 mg





  [facility] LINUS   Administration


 


Diltiazem HCl  60 mg  12/06/19 22:00  12/07/19 10:38





  Cardizem  PO   60 mg





  BID LINUS   Administration


 


Ondansetron HCl  4 mg  12/02/19 15:40 





  Zofran  IV  





  Q8H PRN  





  Nausea And Vomiting  


 


Sodium Chloride  10 ml  12/02/19 22:00  12/07/19 10:38





  Sodium Chloride Flush Syringe 10 Ml  IV   10 ml





  BID LINUS   Administration


 


Sodium Chloride  10 ml  12/02/19 15:40  12/02/19 17:48





  Sodium Chloride Flush Syringe 10 Ml  IV   10 ml





  PRN PRN   Administration





  LINE FLUSH

## 2019-12-07 NOTE — HEM/ONC PROGRESS NOTE
Assessment and Plan





1.  Myelodysplastic syndrome being followed by Dr. Beltran.  There is a plan for 

chemotherapy.


2.  History of colon cancer with liver mets, status post surgery.  This was 20 

years ago.


3.  Anemia secondary to myelodysplastic syndrome, transfusion support given.


4.  Leukopenia secondary to myelodysplastic syndrome.


5.  Thrombocytopenia secondary to myelodysplastic syndrome, platelet count is 

slightly low.  This may be secondary to medications.  I will follow along during

the patient's stay and after discharge he will be seen by Dr. Beltran in the 

clinic setting.  He came with syncopal episode.  Cardiology team, Nephrology 

team and ICU team has seen the patient.








sats were 88% on ambulating 


wants to go home


wife has o2 - which she uses PRN





- Patient Problems


(1) Myelodysplastic syndrome


Status: Chronic   





Subjective


Date of service: 12/07/19


Principal diagnosis: MDS


Interval history: 





feeling better


wants to go home








Objective





- Exam


Narrative Exam: 





Pain - none


General appearance - awake 


Performance status limited self care


Eyes - no icterus


ENT  - on o2 - on and off





LNs  cervical  not palpable


Neck  - no LN


Respiratory  Normal


Breath sounds - CTA anteriorly





CVS  S1 S2 +


Extremities  no edema


General GI  Soft


Rectal  deferred


male  - deferred


Skin  warm 


Musculoskeletal -ambulating


Neurologically awake 





- Constitutional


Vitals: 


                                Last Vital Signs











Temp  98.4 F   12/07/19 05:15


 


Pulse  87   12/07/19 10:38


 


Resp  18   12/07/19 07:37


 


BP  127/44   12/07/19 10:38


 


Pulse Ox  98   12/07/19 09:15














- Labs


Lab Results: 


                         Laboratory Results - last 24 hr











  12/07/19





  14:09


 


Sodium  139


 


Potassium  4.0


 


Chloride  100.0


 


Carbon Dioxide  23


 


Anion Gap  20


 


BUN  25 H


 


Creatinine  1.2


 


Estimated GFR  59


 


BUN/Creatinine Ratio  21


 


Glucose  194 H


 


Calcium  8.3 L














Medications & Allergies





- Medications


Allergies/Adverse Reactions: 


                                    Allergies





morphine Adverse Reaction (Verified 12/18/18 16:36)


   HALLUCINATIONS


IVP DYE Adverse Reaction (Uncoded 12/18/18 16:36)


   Hives








Home Medications: 


                                Home Medications











 Medication  Instructions  Recorded  Confirmed  Last Taken  Type


 


Aspirin EC [Halfprin EC] 81 mg PO QDAY 05/05/16 12/02/19 10 Days Ago History





    ~12/17/18 


 


Pravastatin Sodium [Pravastatin] 20 mg PO QHS 05/05/16 12/04/19 12/27/18 04:30 

History


 


amLODIPine 5 mg PO DAILY 05/05/16 12/02/19 12/27/18 04:30 History


 


ALBUTEROL Inhaler(NF) [VENTOLIN 2 puff IH QDAY PRN 12/18/18 12/02/19 Unknown 

History





Inhaler(NF)]     


 


Fluticasone [Flonase] 1 spray NS QDAY 12/18/18 12/04/19 12/26/18 History


 


Glucosam-Chondro-Herb 149-Hyal 1,500 mg PO BID 12/18/18 12/02/19 10 Days Ago 

History





[Glucosamine-Chondr Complex Tab]    ~12/17/18 


 


Multivit-Min/FA/Lycopen/Lutein 1 each PO DAILY 12/18/18 12/04/19 10 Days Ago 

History





[Centrum Silver Tablet]    ~12/17/18 


 


Omega-3 Fatty Acids/Fish Oil [Fish 1 each PO DAILY 12/18/18 12/04/19 10 Days Ago

 History





Oil 1,000 mg Capsule]    ~12/17/18 


 


Umeclidinium Brm/Vilanterol Tr 1 each IH DAILY 12/18/18 12/04/19 12/27/18 04:30 

History





[Anoro Ellipta 62.5-25 Mcg INH]     


 


Metoprolol Succinate [Toprol Xl] 100 mg PO DAILY 12/02/19 12/04/19 Unknown 

History


 


Multivit-Min/FA/Lycopen/Lutein 1 each PO DAILY 12/02/19 12/04/19 Unknown History





[Centrum Silver Tablet]     











Active Medications: 














Generic Name Dose Route Start Last Admin





  Trade Name Freq  PRN Reason Stop Dose Admin


 


Acetaminophen  650 mg  12/02/19 15:40 





  Tylenol  PO  





  Q4H PRN  





  Pain MILD(1-3)/Fever >100.5/HA  


 


Albuterol  2.5 mg  12/04/19 16:38 





  Proventil  IH  





  Q4HRT PRN  





  Shortness Of Breath  


 


Atorvastatin Calcium  10 mg  12/02/19 22:00  12/06/19 21:22





  Atorvastatin  PO   10 mg





  QHS LINUS   Administration


 


Diltiazem HCl  60 mg  12/06/19 22:00  12/07/19 10:38





  Cardizem  PO   60 mg





  BID LINUS   Administration


 


Ondansetron HCl  4 mg  12/02/19 15:40 





  Zofran  IV  





  Q8H PRN  





  Nausea And Vomiting  


 


Sodium Chloride  10 ml  12/02/19 22:00  12/07/19 10:38





  Sodium Chloride Flush Syringe 10 Ml  IV   10 ml





  BID LINUS   Administration


 


Sodium Chloride  10 ml  12/02/19 15:40  12/02/19 17:48





  Sodium Chloride Flush Syringe 10 Ml  IV   10 ml





  PRN PRN   Administration





  LINE FLUSH